# Patient Record
Sex: FEMALE | Race: WHITE | Employment: FULL TIME | URBAN - METROPOLITAN AREA
[De-identification: names, ages, dates, MRNs, and addresses within clinical notes are randomized per-mention and may not be internally consistent; named-entity substitution may affect disease eponyms.]

---

## 2017-01-04 ENCOUNTER — OFFICE VISIT (OUTPATIENT)
Dept: PULMONOLOGY | Age: 47
End: 2017-01-04

## 2017-01-04 VITALS
WEIGHT: 209 LBS | BODY MASS INDEX: 31.67 KG/M2 | HEART RATE: 74 BPM | HEIGHT: 68 IN | OXYGEN SATURATION: 98 % | DIASTOLIC BLOOD PRESSURE: 80 MMHG | SYSTOLIC BLOOD PRESSURE: 112 MMHG

## 2017-01-04 DIAGNOSIS — E66.9 NON MORBID OBESITY, UNSPECIFIED OBESITY TYPE: Chronic | ICD-10-CM

## 2017-01-04 DIAGNOSIS — G43.909 MIGRAINE WITHOUT STATUS MIGRAINOSUS, NOT INTRACTABLE, UNSPECIFIED MIGRAINE TYPE: Chronic | ICD-10-CM

## 2017-01-04 DIAGNOSIS — G47.33 OBSTRUCTIVE SLEEP APNEA (ADULT) (PEDIATRIC): Primary | ICD-10-CM

## 2017-01-04 PROCEDURE — 99213 OFFICE O/P EST LOW 20 MIN: CPT | Performed by: INTERNAL MEDICINE

## 2017-01-04 ASSESSMENT — SLEEP AND FATIGUE QUESTIONNAIRES
HOW LIKELY ARE YOU TO NOD OFF OR FALL ASLEEP WHEN YOU ARE A PASSENGER IN A CAR FOR AN HOUR WITHOUT A BREAK: 1
ESS TOTAL SCORE: 6
HOW LIKELY ARE YOU TO NOD OFF OR FALL ASLEEP WHILE WATCHING TV: 1
HOW LIKELY ARE YOU TO NOD OFF OR FALL ASLEEP WHILE SITTING AND READING: 1
HOW LIKELY ARE YOU TO NOD OFF OR FALL ASLEEP WHILE LYING DOWN TO REST IN THE AFTERNOON WHEN CIRCUMSTANCES PERMIT: 3
HOW LIKELY ARE YOU TO NOD OFF OR FALL ASLEEP IN A CAR, WHILE STOPPED FOR A FEW MINUTES IN TRAFFIC: 0
HOW LIKELY ARE YOU TO NOD OFF OR FALL ASLEEP WHILE SITTING QUIETLY AFTER LUNCH WITHOUT ALCOHOL: 0
HOW LIKELY ARE YOU TO NOD OFF OR FALL ASLEEP WHILE SITTING AND TALKING TO SOMEONE: 0
HOW LIKELY ARE YOU TO NOD OFF OR FALL ASLEEP WHILE SITTING INACTIVE IN A PUBLIC PLACE: 0

## 2017-01-04 ASSESSMENT — ENCOUNTER SYMPTOMS
APNEA: 0
CHOKING: 0
COUGH: 0
SHORTNESS OF BREATH: 0
RHINORRHEA: 0

## 2017-01-26 ENCOUNTER — CARE COORDINATION (OUTPATIENT)
Dept: CARE COORDINATION | Age: 47
End: 2017-01-26

## 2017-02-05 ENCOUNTER — TELEPHONE (OUTPATIENT)
Dept: INTERNAL MEDICINE CLINIC | Age: 47
End: 2017-02-05

## 2017-02-05 DIAGNOSIS — R92.8 ABNORMAL MAMMOGRAM OF RIGHT BREAST: Primary | ICD-10-CM

## 2017-03-30 ENCOUNTER — OFFICE VISIT (OUTPATIENT)
Dept: PULMONOLOGY | Age: 47
End: 2017-03-30

## 2017-03-30 VITALS
SYSTOLIC BLOOD PRESSURE: 118 MMHG | OXYGEN SATURATION: 99 % | WEIGHT: 207 LBS | HEART RATE: 69 BPM | BODY MASS INDEX: 31.37 KG/M2 | DIASTOLIC BLOOD PRESSURE: 82 MMHG | HEIGHT: 68 IN

## 2017-03-30 DIAGNOSIS — G43.909 MIGRAINE WITHOUT STATUS MIGRAINOSUS, NOT INTRACTABLE, UNSPECIFIED MIGRAINE TYPE: Chronic | ICD-10-CM

## 2017-03-30 DIAGNOSIS — E66.9 NON MORBID OBESITY, UNSPECIFIED OBESITY TYPE: Chronic | ICD-10-CM

## 2017-03-30 DIAGNOSIS — G47.33 OBSTRUCTIVE SLEEP APNEA (ADULT) (PEDIATRIC): Primary | Chronic | ICD-10-CM

## 2017-03-30 PROCEDURE — 99213 OFFICE O/P EST LOW 20 MIN: CPT | Performed by: INTERNAL MEDICINE

## 2017-03-30 ASSESSMENT — SLEEP AND FATIGUE QUESTIONNAIRES
HOW LIKELY ARE YOU TO NOD OFF OR FALL ASLEEP WHILE SITTING QUIETLY AFTER LUNCH WITHOUT ALCOHOL: 0
HOW LIKELY ARE YOU TO NOD OFF OR FALL ASLEEP IN A CAR, WHILE STOPPED FOR A FEW MINUTES IN TRAFFIC: 0
HOW LIKELY ARE YOU TO NOD OFF OR FALL ASLEEP WHILE SITTING AND TALKING TO SOMEONE: 0
HOW LIKELY ARE YOU TO NOD OFF OR FALL ASLEEP WHILE LYING DOWN TO REST IN THE AFTERNOON WHEN CIRCUMSTANCES PERMIT: 1
HOW LIKELY ARE YOU TO NOD OFF OR FALL ASLEEP WHILE SITTING INACTIVE IN A PUBLIC PLACE: 0
HOW LIKELY ARE YOU TO NOD OFF OR FALL ASLEEP WHILE SITTING AND READING: 1
ESS TOTAL SCORE: 5
HOW LIKELY ARE YOU TO NOD OFF OR FALL ASLEEP WHILE WATCHING TV: 2
HOW LIKELY ARE YOU TO NOD OFF OR FALL ASLEEP WHEN YOU ARE A PASSENGER IN A CAR FOR AN HOUR WITHOUT A BREAK: 1

## 2017-03-30 ASSESSMENT — ENCOUNTER SYMPTOMS
APNEA: 0
SHORTNESS OF BREATH: 0
RHINORRHEA: 0
CHOKING: 0
COUGH: 0

## 2017-05-31 ENCOUNTER — TELEPHONE (OUTPATIENT)
Dept: INTERNAL MEDICINE CLINIC | Age: 47
End: 2017-05-31

## 2017-06-01 ENCOUNTER — OFFICE VISIT (OUTPATIENT)
Dept: INTERNAL MEDICINE CLINIC | Age: 47
End: 2017-06-01

## 2017-06-01 ENCOUNTER — TELEPHONE (OUTPATIENT)
Dept: INTERNAL MEDICINE CLINIC | Age: 47
End: 2017-06-01

## 2017-06-01 VITALS
SYSTOLIC BLOOD PRESSURE: 122 MMHG | BODY MASS INDEX: 32.33 KG/M2 | TEMPERATURE: 98.2 F | HEART RATE: 60 BPM | WEIGHT: 206 LBS | OXYGEN SATURATION: 99 % | DIASTOLIC BLOOD PRESSURE: 76 MMHG | HEIGHT: 67 IN

## 2017-06-01 DIAGNOSIS — B86 SCABIES: Primary | ICD-10-CM

## 2017-06-01 PROCEDURE — 99213 OFFICE O/P EST LOW 20 MIN: CPT | Performed by: INTERNAL MEDICINE

## 2017-06-01 RX ORDER — PERMETHRIN 50 MG/G
CREAM TOPICAL
Qty: 1 TUBE | Refills: 1 | Status: SHIPPED | OUTPATIENT
Start: 2017-06-01 | End: 2018-01-08

## 2017-06-01 RX ORDER — HYDROXYZINE HYDROCHLORIDE 25 MG/1
25 TABLET, FILM COATED ORAL 3 TIMES DAILY PRN
Qty: 60 TABLET | Refills: 0 | Status: SHIPPED | OUTPATIENT
Start: 2017-06-01 | End: 2018-01-08

## 2017-06-01 ASSESSMENT — ENCOUNTER SYMPTOMS: COUGH: 0

## 2017-06-01 ASSESSMENT — PATIENT HEALTH QUESTIONNAIRE - PHQ9
SUM OF ALL RESPONSES TO PHQ9 QUESTIONS 1 & 2: 0
1. LITTLE INTEREST OR PLEASURE IN DOING THINGS: 0
2. FEELING DOWN, DEPRESSED OR HOPELESS: 0
SUM OF ALL RESPONSES TO PHQ QUESTIONS 1-9: 0

## 2017-06-06 RX ORDER — PERMETHRIN 50 MG/G
CREAM TOPICAL
Qty: 60 G | Refills: 2 | Status: SHIPPED | OUTPATIENT
Start: 2017-06-06 | End: 2018-01-08

## 2017-09-27 ENCOUNTER — TELEPHONE (OUTPATIENT)
Dept: SLEEP MEDICINE | Age: 47
End: 2017-09-27

## 2018-01-08 ENCOUNTER — OFFICE VISIT (OUTPATIENT)
Dept: INTERNAL MEDICINE CLINIC | Age: 48
End: 2018-01-08

## 2018-01-08 VITALS
SYSTOLIC BLOOD PRESSURE: 114 MMHG | BODY MASS INDEX: 31.37 KG/M2 | HEART RATE: 74 BPM | HEIGHT: 68 IN | DIASTOLIC BLOOD PRESSURE: 70 MMHG | RESPIRATION RATE: 16 BRPM | OXYGEN SATURATION: 99 % | WEIGHT: 207 LBS | TEMPERATURE: 98.5 F

## 2018-01-08 DIAGNOSIS — S49.92XA INJURY OF LEFT SHOULDER, INITIAL ENCOUNTER: Primary | ICD-10-CM

## 2018-01-08 PROCEDURE — 99213 OFFICE O/P EST LOW 20 MIN: CPT | Performed by: INTERNAL MEDICINE

## 2018-01-08 RX ORDER — TRAMADOL HYDROCHLORIDE 50 MG/1
50 TABLET ORAL EVERY 8 HOURS PRN
Qty: 60 TABLET | Refills: 0 | Status: SHIPPED | OUTPATIENT
Start: 2018-01-08 | End: 2018-02-07

## 2018-01-08 RX ORDER — NAPROXEN 500 MG/1
500 TABLET ORAL 2 TIMES DAILY
Qty: 60 TABLET | Refills: 0 | Status: SHIPPED | OUTPATIENT
Start: 2018-01-08 | End: 2022-07-12

## 2018-01-08 NOTE — PROGRESS NOTES
Subjective:      Patient ID: Sourav Chi is a 56 y.o. female. Shoulder Pain    The pain is present in the left shoulder. This is a new problem. Episode onset: 6 months. There has been no history of extremity trauma. The problem occurs daily. The problem has been waxing and waning (initially getting better and now it is worse. ). The quality of the pain is described as aching. The pain is at a severity of 8/10. The pain is moderate. Associated symptoms include a limited range of motion and stiffness. Pertinent negatives include no fever, inability to bear weight, itching, joint locking, joint swelling, numbness or tingling. The symptoms are aggravated by activity. She has tried acetaminophen and NSAIDS for the symptoms. The treatment provided moderate relief. Family history does not include gout or rheumatoid arthritis. There is no history of diabetes, gout, osteoarthritis or rheumatoid arthritis. BMI 31. She did meet dietician. Review of Systems   Constitutional: Negative for fever. Musculoskeletal: Positive for stiffness. Negative for gout. Skin: Negative for itching. Neurological: Negative for tingling and numbness. All other systems reviewed and are negative. No Known Allergies    Current Outpatient Prescriptions   Medication Sig Dispense Refill    permethrin (ELIMITE) 5 % cream Apply topically as directed 60 g 2    permethrin (ELIMITE) 5 % cream Massage into skin from head to feet, leave on 8-14 hours, wash off. May repeat in 14 days.  1 Tube 1    butalbital-acetaminophen-caffeine (FIORICET, ESGIC) -40 MG per tablet Take 1 tablet by mouth every 6 hours as needed for Headaches 180 tablet 3    naltrexone-bupropion (CONTRAVE) 8-90 MG per extended release tablet Take 2 tablets by mouth See Admin Instructions Week 1:  1 po qdaily   Week 2:  1 po qam and 1 po qpm  Week 3:  2 po qam and 1 po qpm  Week 4:  2 po qam and 2 po qam 70 tablet 0    naproxen (NAPROSYN) 500 MG tablet Take 1 tablet by mouth 2 times daily. 60 tablet 0     No current facility-administered medications for this visit. Vitals:    01/08/18 1355   BP: 114/70   Pulse: 74   Resp: 16   Temp: 98.5 °F (36.9 °C)   TempSrc: Oral   SpO2: 99%   Weight: 207 lb (93.9 kg)   Height: 5' 8\" (1.727 m)     Body mass index is 31.47 kg/m². Wt Readings from Last 3 Encounters:   01/08/18 207 lb (93.9 kg)   06/01/17 206 lb (93.4 kg)   03/30/17 207 lb (93.9 kg)     BP Readings from Last 3 Encounters:   01/08/18 114/70   06/01/17 122/76   03/30/17 118/82       Objective:   Physical Exam   Constitutional: She is oriented to person, place, and time. Vital signs are normal. She appears well-developed and well-nourished. No distress. HENT:   Head: Normocephalic and atraumatic. Right Ear: Hearing normal.   Left Ear: Hearing normal.   Nose: Nose normal.   Mouth/Throat: Oropharynx is clear and moist.   Eyes: Conjunctivae and EOM are normal. Pupils are equal, round, and reactive to light. Neck: Normal range of motion. Neck supple. No thyromegaly present. Cardiovascular: Normal rate, regular rhythm, normal heart sounds and intact distal pulses. Pulmonary/Chest: Effort normal and breath sounds normal.   Abdominal: Soft. Bowel sounds are normal. She exhibits no distension. Musculoskeletal:        Right shoulder: Normal.        Left shoulder: She exhibits decreased range of motion, tenderness, pain and decreased strength. She exhibits no bony tenderness, no swelling, no effusion, no crepitus, no deformity, no laceration, no spasm and normal pulse. Neurological: She is alert and oriented to person, place, and time. Skin: Skin is warm. Psychiatric: She has a normal mood and affect. Her behavior is normal. Judgment and thought content normal.   Nursing note and vitals reviewed. Assessment/Plan:  Zen Thao was seen today for shoulder pain.     Diagnoses and all orders for this visit:    Injury of left shoulder, initial encounter  - MRI Shoulder Left W JT WO Contrast; Future  -     [facility] PT - [facility]  -     Diclofenac Sodium POWD; Apply 1-2 g topically 4 times daily Aekhrht1B Baclo2%+Diclo3%+DMSO5%+Gaba6%+Lido2%+Prilo2% Pharm to Cmpd  -     naproxen (NAPROSYN) 500 MG tablet; Take 1 tablet by mouth 2 times daily  -     traMADol (ULTRAM) 50 MG tablet; Take 1 tablet by mouth every 8 hours as needed for Pain (use sparingly) for up to 30 days. Adult BMI 31.0-31.9 kg/sq m  -     naltrexone-bupropion (CONTRAVE) 8-90 MG per extended release tablet; Take 2 tablets by mouth See Admin Instructions Week 1:  1 po qdaily   Week 2:  1 po qam and 1 po qpm  Week 3:  2 po qam and 1 po qpm  Week 4:  2 po qam and 2 po qam      Return in about 2 months (around 3/8/2018) for shoulder and weight check.

## 2018-01-16 ENCOUNTER — HOSPITAL ENCOUNTER (OUTPATIENT)
Dept: MRI IMAGING | Age: 48
Discharge: HOME | End: 2018-01-16
Attending: INTERNAL MEDICINE | Admitting: INTERNAL MEDICINE

## 2018-01-16 DIAGNOSIS — S49.92XA UNSPECIFIED INJURY OF LEFT SHOULDER AND UPPER ARM, INITIAL ENCOUNTER: ICD-10-CM

## 2018-01-16 DIAGNOSIS — S49.92XA INJURY OF LEFT SHOULDER, INITIAL ENCOUNTER: ICD-10-CM

## 2018-01-17 ENCOUNTER — TELEPHONE (OUTPATIENT)
Dept: INTERNAL MEDICINE CLINIC | Age: 48
End: 2018-01-17

## 2018-01-18 ENCOUNTER — TELEPHONE (OUTPATIENT)
Dept: INTERNAL MEDICINE CLINIC | Age: 48
End: 2018-01-18

## 2018-01-29 ENCOUNTER — HOSPITAL ENCOUNTER (OUTPATIENT)
Dept: PHYSICAL THERAPY | Age: 48
Discharge: HOME | End: 2018-01-31
Attending: INTERNAL MEDICINE | Admitting: INTERNAL MEDICINE

## 2018-01-29 NOTE — FLOWSHEET NOTE
Full no pain  Ext full no pain  R rot full pain L ant shoulder   L rot  Full no pain  R SB full no pain  L SB full  No pain       Other                    Strength defer due pain 1/29 L R Comment   Flexion         Abduction         ER         IR         Supraspinatus         Upper Trap         Lower Trap         Mid Trap         Rhomboids         Biceps         Triceps         Horizontal Abduction         Horizontal Adduction         Lats            Special Tests Results/Comment   subscap bear hug + L 1/29   Neers     Speeds     OBriens     Other     Neurologic Signs     Functional Reach        Reflexes/Sensation:               [x]Dermatomes/Myotomes intact 1/29              [x]Reflexes equal and normal bilaterally              []Other:     Joint mobility: deferred due to pain 1/29              []Normal               []Hypo              []Hyper     Palpation: mild TTP  anterior shoulder 1/29     Functional Mobility/Transfers: I with mild L shoulder protective posture 1/29     Posture: slouched but otherwise grossly WFL for 53 yr old female  1/29     Bandages/Dressings/Incisions: NA  1/29     Gait: (include devices/WB status): WNL with mild L shoulder protective posture 1/29                          [x] Patient history, allergies, meds reviewed. Medical chart reviewed. See intake form. 1/29     Review Of Systems (ROS):  [x]Performed Review of systems (Integumentary, CardioPulmonary, Neurological) by intake and observation. Intake form has been scanned into medical record. Patient has been instructed to contact their primary care physician regarding ROS issues if not already being addressed at this time.   1/29      RESTRICTIONS/PRECAUTIONS: none    Exercises/Interventions:   Exercises:    Exercise/Equipment Resistance/Repetitions Other comments   Stretching/PROM     Wand     Table Slides flexion [id number]  scaption [id number]    UE [place] [facility] at 0 18f00mod    Pulleys     Pendulum 30x cw ccw ea         Isometrics scapular, scapulothoracic and UE control with self care, reaching, carrying, lifting, house/yardwork, driving/computer work      Manual Treatments:  PROM / STM / Oscillations-Mobs:  G-I, II, III, IV (PA's, Inf., Post.)  [] (88773) Provided manual therapy to mobilize soft tissue/joints of cervical/CT, scapular GHJ and UE for the purpose of modulating pain, promoting relaxation,  increasing ROM, reducing/eliminating soft tissue swelling/inflammation/restriction, improving soft tissue extensibility and allowing for proper ROM for normal function with self care, reaching, carrying, lifting, house/yardwork, driving/computer work    Modalities: Ice wrap to go 1/29    Charges:  Timed Code Treatment Minutes: 35   Total Treatment Minutes: 879-765       [x] EVAL (LOW) 67326 (typically 20 minutes face-to-face)  [] EVAL (MOD) 23519 (typically 30 minutes face-to-face)  [] EVAL (HIGH) 32738 (typically 45 minutes face-to-face)  [] RE-EVAL     [x] HH(49607) x  2   [] IONTO  [] NMR (99540) x      [] VASO  [] Manual (68531) x       [] Other:  [] TA x       [] Mech Traction (73186)  [] ES(attended) (82470)      [] ES (un) (65561):     GOALS: (cut and paste from eval)  Patient stated goal: no pain, adls and hobbies without pain     Therapist goals for Patient:   Short Term Goals: To be achieved in: 2 weeks  1. Independent in HEP and progression per patient tolerance, in order to prevent re-injury. 2. Patient will have a decrease in pain to facilitate improvement in movement, function, and ADLs as indicated by Functional Deficits.     Long Term Goals: To be achieved in:   1. Disability index score of 15% or less for the UEFS to assist with reaching prior level of function. 8-10 weeks  2. Patient will demonstrate increased AROM to WNL  6-8 weeks to allow for proper joint functioning as indicated by patients Functional Deficits.    3. Patient will demonstrate an increase in Strength to good scapular and core control  for UE to allow

## 2018-01-29 NOTE — PLAN OF CARE
[facility] [address]  Phone [phone]  Fax [phone]      Physical Therapy Certification    Dear Referring Practitioner: Juni Archibald MD,    We had the pleasure of evaluating the following patient for physical therapy services at [address] [place] [address]. A summary of our findings can be found in the initial assessment below. This includes our plan of care. If you have any questions or concerns regarding these findings, please do not hesitate to contact me at the office phone number checked above. Thank you for the referral.       Physician Signature:_______________________________Date:__________________  By signing above (or electronic signature), therapists plan is approved by physician      Patient: Wendy Chi   : 1970   MRN: [id number]  Referring Physician: Referring Practitioner: Juni Archibald MD      Evaluation Date: 2018      Medical Diagnosis Information:  Diagnosis: S49.92XD(ICD-10-CM) - Injury of left shoulder   Treatment Diagnosis: L shoulder pain and decrease dROM                                         Insurance information: PT Insurance Information: anthem    Precautions/ Contra-indications: none  Latex Allergy:  [x]NO      []YES  Preferred Language for Healthcare:   [x]English       []other:    SUBJECTIVE: Patient stated complaint: on/off since 2017, may have been from doing yard work, few weeks back turning body and sneezing, increased since then    Relevant Medical History:none  Functional Disability Index:PT G-Codes  Functional Assessment Tool Used: UEFS  Score: 60%  Functional Limitation: Carrying, moving and handling objects  Carrying, Moving and Handling Objects Current Status ():  At least 40 percent but less than 60 percent impaired, limited or restricted  Carrying, Moving and Handling Objects Goal Status (): 0 percent impaired, Intake form has been scanned into medical record. Patient has been instructed to contact their primary care physician regarding ROS issues if not already being addressed at this time. 1/29  Co-morbidities/Complexities (which will affect course of rehabilitation):   []None           Arthritic conditions   []Rheumatoid arthritis (M05.9)  []Osteoarthritis (M19.91)   Cardiovascular conditions   []Hypertension (I10)  []Hyperlipidemia (E78.5)  []Angina pectoris (I20)  []Atherosclerosis (I70)   Musculoskeletal conditions   []Disc pathology   []Congenital spine pathologies   []Prior surgical intervention  []Osteoporosis (M81.8)  []Osteopenia (M85.8)   Endocrine conditions   []Hypothyroid (E03.9)  []Hyperthyroid Gastrointestinal conditions   []Constipation (X17.11)   Metabolic conditions   []Morbid obesity (E66.01)  []Diabetes type 1(E10.65) or 2 (E11.65)   []Neuropathy (G60.9)     Pulmonary conditions   []Asthma (J45)  []Coughing   []COPD (J44.9)   Psychological Disorders  []Anxiety (F41.9)  []Depression (F32.9)   []Other:   []Other:          Barriers to/and or personal factors that will affect rehab potential:              [x]Age  [x]Sex              []Motivation/Lack of Motivation                        []Co-Morbidities              []Cognitive Function, education/learning barriers              []Environmental, home barriers              []profession/work barriers  []past PT/medical experience  []other:  Justification: above noted may affect rehab potential   Falls Risk Assessment (30 days):   [x] Falls Risk assessed and no intervention required. [] Falls Risk assessed and Patient requires intervention due to being higher risk   TUG score (>12s at risk):     [] Falls education provided, including       G-Codes:  PT G-Codes  Functional Assessment Tool Used: UEFS  Score: 60%  Functional Limitation: Carrying, moving and handling objects  Carrying, Moving and Handling Objects Current Status ():  At least 40 percent but

## 2018-02-01 ENCOUNTER — HOSPITAL ENCOUNTER (OUTPATIENT)
Dept: PHYSICAL THERAPY | Age: 48
Discharge: HOME | End: 2018-02-28
Attending: INTERNAL MEDICINE | Admitting: INTERNAL MEDICINE

## 2018-02-02 ENCOUNTER — HOSPITAL ENCOUNTER (OUTPATIENT)
Dept: PHYSICAL THERAPY | Age: 48
Discharge: HOME | End: 2018-02-03
Admitting: INTERNAL MEDICINE

## 2018-02-02 ENCOUNTER — TELEPHONE (OUTPATIENT)
Dept: INTERNAL MEDICINE CLINIC | Age: 48
End: 2018-02-02

## 2018-02-02 NOTE — FLOWSHEET NOTE
Serratus     Horizontal Abd with ER     Biceps     Triceps     Retraction          Cable Column/Theraband     External Rotation     Internal Rotation     Shrugs     Lats     Ext     Flex     Scapular Retraction     BIC     TRIC     PNF          Dynamic Stability          Plyoback          Manual interventions     L shoulder PROM fl scaption ER IR 15 min start2/2            Therapeutic Exercise and NMR EXR  [x] (24104) Provided verbal/tactile cueing for activities related to strengthening, flexibility, endurance, ROM  for improvements in scapular, scapulothoracic and UE control with self care, reaching, carrying, lifting, house/yardwork, driving/computer work.    [] (54208) Provided verbal/tactile cueing for activities related to improving balance, coordination, kinesthetic sense, posture, motor skill, proprioception  to assist with  scapular, scapulothoracic and UE control with self care, reaching, carrying, lifting, house/yardwork, driving/computer work. Therapeutic Activities:    [] (40302 or 88551) Provided verbal/tactile cueing for activities related to improving balance, coordination, kinesthetic sense, posture, motor skill, proprioception and motor activation to allow for proper function of scapular, scapulothoracic and UE control with self care, carrying, lifting, driving/computer work.      Home Exercise Program:    [x] (27456) Reviewed/Progressed HEP activities related to strengthening, flexibility, endurance, ROM of scapular, scapulothoracic and UE control with self care, reaching, carrying, lifting, house/yardwork, driving/computer work  [] (04607) Reviewed/Progressed HEP activities related to improving balance, coordination, kinesthetic sense, posture, motor skill, proprioception of scapular, scapulothoracic and UE control with self care, reaching, carrying, lifting, house/yardwork, driving/computer work      Manual Treatments:  PROM / STM / Oscillations-Mobs:  G-I, II, III, IV (PA's, Inf., Post.)  [x] (89301) Provided manual therapy to mobilize soft tissue/joints of cervical/CT, scapular GHJ and UE for the purpose of modulating pain, promoting relaxation,  increasing ROM, reducing/eliminating soft tissue swelling/inflammation/restriction, improving soft tissue extensibility and allowing for proper ROM for normal function with self care, reaching, carrying, lifting, house/yardwork, driving/computer work    Modalities: Ice to go 2/2    Charges:  Timed Code Treatment Minutes: 40   Total Treatment Minutes: 446-200       [] EVAL (LOW) 30583 (typically 20 minutes face-to-face)  [] EVAL (MOD) 55595 (typically 30 minutes face-to-face)  [] EVAL (HIGH) 92867 (typically 45 minutes face-to-face)  [] RE-EVAL     [x] QE(93188) x  2   [] IONTO  [] NMR (55302) x      [] VASO  [x] Manual (30814) x  4    [] Other:  [] TA x       [] Mech Traction (18833)  [] ES(attended) (43597)      [] ES (un) (06159):     GOALS: (cut and paste from eval)  Patient stated goal: no pain, adls and hobbies without pain     Therapist goals for Patient:   Short Term Goals: To be achieved in: 2 weeks  1. Independent in HEP and progression per patient tolerance, in order to prevent re-injury. 2. Patient will have a decrease in pain to facilitate improvement in movement, function, and ADLs as indicated by Functional Deficits.     Long Term Goals: To be achieved in:   1. Disability index score of 15% or less for the UEFS to assist with reaching prior level of function. 8-10 weeks  2. Patient will demonstrate increased AROM to WNL  6-8 weeks to allow for proper joint functioning as indicated by patients Functional Deficits. 3. Patient will demonstrate an increase in Strength to good scapular and core control  for UE to allow for proper functional mobility as indicated by patients Functional Deficits. 10-12 weeks  4. Patient will return to  functional activities without increased symptoms or restriction.  adl s6-8 weeks , yard work hobbies/

## 2018-02-09 ENCOUNTER — HOSPITAL ENCOUNTER (OUTPATIENT)
Dept: PHYSICAL THERAPY | Age: 48
Discharge: HOME | End: 2018-02-10
Admitting: INTERNAL MEDICINE

## 2018-02-09 NOTE — FLOWSHEET NOTE
[address], [address] [place] [address]  Phone: [phone]   Fax:     [phone]    Physical Therapy Daily Treatment Note  Date:  2018    Patient Name:  Jordan Chi    :  1970  MRN: [id number]  Restrictions/Precautions:    Medical/Treatment Diagnosis Information:  · Diagnosis: S49.92XD(ICD-10-CM) - Injury of left shoulder   ·   · Treatment Diagnosis: L shoulder pain and decrease dROM   FINDINGS:  MRI results    ROTATOR CUFF: Small interstitial tear of the distal subscapularis tendon mid   fibers measuring 3 x 2 mm.  Rotator cuff otherwise is intact.  Rotator cuff   musculature is normal in bulk and signal.  Small cystic change at the   insertion of the infraspinatus.      ·   Insurance/Certification information:  PT Insurance Information: get  Physician Information:  Referring Practitioner: Beena Milian MD  Plan of care signed (Y/N):     Date of Patient follow up with Physician:     G-Code (if applicable):      Date G-Code Applied:    60%           Latex Allergy:  [x]NO      []YES  Preferred Language for Healthcare:   [x]English       []other:    Visit # Insurance Allowable   3        eval  UNL  80/20   Progress Note:   Next due by: Visit #10  Or week of   Pain level:  4/10 2/9    SUBJECTIVE:    Pain is no longer constant    OBJECTIVE:   R hand dominant   ROM PROM AROM  Comment     L R L      Flexion ~145 pain           Abduction ~110 pain           ER Tae@[email or web address]           IR Karolyn@[email or web address]* pain           Other(cervical)     Flexion  Full no pain  Ext full no pain  R rot full pain L ant shoulder   L rot  Full no pain  R SB full no pain  L SB full  No pain       Other                    Strength defer due pain  L R Comment   Flexion         Abduction         ER         IR         Supraspinatus         Upper Trap         Lower Trap         Mid Trap         Rhomboids     Oscillations-Mobs:  G-I, II, III, IV (PA's, Inf., Post.)  [x] (34278) Provided manual therapy to mobilize soft tissue/joints of cervical/CT, scapular GHJ and UE for the purpose of modulating pain, promoting relaxation,  increasing ROM, reducing/eliminating soft tissue swelling/inflammation/restriction, improving soft tissue extensibility and allowing for proper ROM for normal function with self care, reaching, carrying, lifting, house/yardwork, driving/computer work    Modalities:  Ice at work  2/9    Charges:  Timed Code Treatment Minutes: 40   Total Treatment Minutes: 300-410       [] EVAL (LOW) 01063 (typically 20 minutes face-to-face)  [] EVAL (MOD) 53979 (typically 30 minutes face-to-face)  [] EVAL (HIGH) 57330 (typically 45 minutes face-to-face)  [] RE-EVAL     [x] IE(11069) x  2   [] IONTO  [] NMR (95266) x      [] VASO  [x] Manual (40107) x  1    [] Other:  [] TA x       [] Mech Traction (66798)  [] ES(attended) (94797)      [] ES (un) (10900):     GOALS: (cut and paste from eval)  Patient stated goal: no pain, adls and hobbies without pain     Therapist goals for Patient:   Short Term Goals: To be achieved in: 2 weeks  1. Independent in HEP and progression per patient tolerance, in order to prevent re-injury. 2. Patient will have a decrease in pain to facilitate improvement in movement, function, and ADLs as indicated by Functional Deficits.     Long Term Goals: To be achieved in:   1. Disability index score of 15% or less for the UEFS to assist with reaching prior level of function. 8-10 weeks  2. Patient will demonstrate increased AROM to WNL  6-8 weeks to allow for proper joint functioning as indicated by patients Functional Deficits. 3. Patient will demonstrate an increase in Strength to good scapular and core control  for UE to allow for proper functional mobility as indicated by patients Functional Deficits. 10-12 weeks  4.  Patient will return to  functional activities without increased symptoms

## 2018-02-14 ENCOUNTER — HOSPITAL ENCOUNTER (OUTPATIENT)
Dept: PHYSICAL THERAPY | Age: 48
Discharge: HOME | End: 2018-02-15
Admitting: INTERNAL MEDICINE

## 2018-02-14 NOTE — FLOWSHEET NOTE
Upper Trap         Lower Trap         Mid Trap         Rhomboids         Biceps         Triceps         Horizontal Abduction         Horizontal Adduction         Lats            Special Tests Results/Comment   subscap bear hug + L 1/29   Neers     Speeds     OBriens     Other     Neurologic Signs     Functional Reach        Reflexes/Sensation:               [x]Dermatomes/Myotomes intact 1/29              [x]Reflexes equal and normal bilaterally              []Other:     Joint mobility: deferred due to pain 1/29              []Normal               []Hypo              []Hyper     Palpation: mild TTP  anterior shoulder 1/29     Functional Mobility/Transfers: I with mild L shoulder protective posture 1/29     Posture: slouched but otherwise grossly WFL for 53 yr old female  1/29     Bandages/Dressings/Incisions: NA  1/29     Gait: (include devices/WB status): WNL with mild L shoulder protective posture 1/29                          [x] Patient history, allergies, meds reviewed. Medical chart reviewed. See intake form. 1/29     Review Of Systems (ROS):  [x]Performed Review of systems (Integumentary, CardioPulmonary, Neurological) by intake and observation. Intake form has been scanned into medical record. Patient has been instructed to contact their primary care physician regarding ROS issues if not already being addressed at this time.   1/29      RESTRICTIONS/PRECAUTIONS: none    Exercises/Interventions:   Exercises:    Exercise/Equipment Resistance/Repetitions Other comments   Stretching/PROM     Wand     Table Slides flexion 05x16xqw  scaption [id number]    UE [place]ael     Pulleys Flexion [id number]  scaption 38x68qsh start   Pendulum 30x cw ccw ea    IR grab hand from behind    start2/2   Isometrics     Retraction 3x10         Weight shift     Flexion     Abduction     External Rotation     Internal Rotation     Biceps     Triceps          PRE's     Flexion     Abduction     External Rotation     Internal activities without increased symptoms or restriction. adl s6-8 weeks , yard work hobbies/ 10-12 weeks                  Progression Towards Functional goals:  [] Patient is progressing as expected towards functional goals listed. [] Progression is slowed due to complexities listed. [] Progression has been slowed due to co-morbidities. [x] Plan just implemented, too soon to assess goals progression  [] Other:     ASSESSMENT:  See eval    Treatment/Activity Tolerance:  [x] Patient tolerated treatment fair 2/14 [] Patient limited by fatique  [x] Patient limited by pain 2/14 [] Patient limited by other medical complications  [x] Other: modified treatment due to increased pain since Saturday.   Reviewed with pt that she may benefit from orhto consult and a possible cortisone injection if she continues to have increased pain 2/14     Patient education: 1/29 reviewed HEP and POC     Prognosis: [x] Good [] Fair  [] Poor    Patient Requires Follow-up: [x] Yes  [] No    PLAN: 1-2 days per week for 4 Weeks:1/29-2/29/2018  [x] Continue per plan of care [] Alter current plan (see comments)  [] Plan of care initiated [] Hold pending MD visit [] Discharge    Electronically signed by: Cullen Cameron PT,

## 2018-02-20 ENCOUNTER — OFFICE VISIT (OUTPATIENT)
Dept: ORTHOPEDIC SURGERY | Age: 48
End: 2018-02-20

## 2018-02-20 VITALS
WEIGHT: 204 LBS | SYSTOLIC BLOOD PRESSURE: 123 MMHG | HEIGHT: 68 IN | DIASTOLIC BLOOD PRESSURE: 81 MMHG | BODY MASS INDEX: 30.92 KG/M2 | HEART RATE: 80 BPM

## 2018-02-20 DIAGNOSIS — M25.512 LEFT SHOULDER PAIN, UNSPECIFIED CHRONICITY: Primary | ICD-10-CM

## 2018-02-20 DIAGNOSIS — M75.112 PARTIAL TEAR OF LEFT ROTATOR CUFF: ICD-10-CM

## 2018-02-20 PROCEDURE — 99203 OFFICE O/P NEW LOW 30 MIN: CPT | Performed by: ORTHOPAEDIC SURGERY

## 2018-02-20 RX ORDER — METHYLPREDNISOLONE 4 MG/1
TABLET ORAL
Qty: 1 KIT | Refills: 0 | Status: SHIPPED | OUTPATIENT
Start: 2018-02-20 | End: 2018-02-26

## 2018-02-20 NOTE — PROGRESS NOTES
qpm  Week 3:  2 po qam and 1 po qpm  Week 4:  2 po qam and 2 po qam 70 tablet 0    butalbital-acetaminophen-caffeine (FIORICET, ESGIC) -40 MG per tablet Take 1 tablet by mouth every 6 hours as needed for Headaches 180 tablet 3     No current facility-administered medications on file prior to visit.           Review of Systems:  A 14 point review of systems available in the scanned medical record, dated today, under the media tab, as documented by the patient. The review is negative with the exception of those things mentioned in the HPI and Past Medical History. Vital Signs:  Vitals:    02/20/18 1500   BP: 123/81   Pulse: 80       General Exam:   Constitutional: Patient is adequately groomed with no evidence of malnutrition  Mental Status: The patient is oriented to time, place and person. The patient's mood and affect are appropriate. Vascular: Examination reveals no swelling or calf tenderness. Peripheral pulses are palpable and 2+. Upper Extremity:    L shoulder exam    Inspection:  No gross deformities, periscapular musculature is symmetry without atrophy, no obvious winging    Palpation: Mild TTP anterior over biceps tendon. NTTP over her AC    Active/Passive ROM:FF to 100 (P150), EROT 20, IROT to Sacrum    Strength: 5/5  supraspinatus, 4/5 infraspinatus, teres minor, and 3+/5 subscapularis    Stability: negative sulcus sign, no evidence of instability    Neurovascular: Neurovascularly intact    Special Tests:MIldly positive belly press and (+) lift off. (-) Empty can.          R comparison shoulder exam    Inspection:  No gross deformities, periscapular musculature is symmetry without atrophy, no obvious winging    Palpation: No significant tenderness overlying the rotator cuff footprint, bicipital groove, AC joint, or periscapular region    Active/Passive ROM: full in forward flexion, abduction, external rotation with the elbow at the side, and internal rotation    Strength: 5/5 strength supraspinatus, infraspinatus, teres minor, and subscapularis    Stability: negative sulcus sign, no evidence of instability    Neurovascular: Neurovascularly intact      Imagin view x-rays of left shoulder were obtained in the office today including AP and lateral.  No bony abnormalities were noted. We reviewed her MRI here that showed a partial subscap injury with a slightly perched biceps tendon. Assessment :  L shoulder RCT    Impression:  Encounter Diagnoses   Name Primary?  Left shoulder pain, unspecified chronicity Yes    Partial tear of left rotator cuff          Plan:  We discussed the pathophysiology, as well as the natural history of this type of disease process with the patient. We reviewed the imaging as well as the pertinent anatomy with handouts. When he has a difficult problem today. The main question is whether she just bruised her shoulder and has a minor exacerbation, or whether she turned a partial rotator cuff tear that was well healing into complete rotator cuff tear. We discussed with her the options of both pursuing an old tear versus treating her symptomatically right now getting her back in a rehab. She chose the latter. We'll get her a Medrol Dosepak to try to knock down her inflammation symptoms are now for a few days and then her get back into rehab. If over the next couple weeks she is not feeling she is clearly going the correct direction, we'll obtain another MRI of her left shoulder, with the suspicion than of having completed subscapularis tear. Her function is good on exam today but she may well be compensating with her deltoid and latissimus muscles. Medrol, f/u 2 weeks    We reviewed the plan with the patient, who verbally understands, and is electing to proceed. If symptoms are not moving in the correct direction, we will repeat exam, and consider any other advanced imaging.   Patient understands there to come back sooner if they experience any

## 2018-02-20 NOTE — PROGRESS NOTES
Review of Systems   Musculoskeletal: Positive for joint pain. Left shoulder     Neurological: Positive for headaches. All other systems reviewed and are negative.

## 2018-02-20 NOTE — PROGRESS NOTES
activity: Yes     Partners: Male     Other Topics Concern    Not on file     Social History Narrative    Lives home with  and daughter. She has an inherited Tarantula a 36year old tarantula. No Known Allergies  Current Outpatient Prescriptions on File Prior to Visit   Medication Sig Dispense Refill    Diclofenac Sodium POWD Apply 1-2 g topically 4 times daily [id number] Baclo2%+Diclo3%+DMSO5%+Gaba6%+Lido2%+Prilo2% Pharm to Cmpd 120 g 2    naproxen (NAPROSYN) 500 MG tablet Take 1 tablet by mouth 2 times daily 60 tablet 0    naltrexone-bupropion (CONTRAVE) 8-90 MG per extended release tablet Take 2 tablets by mouth See Admin Instructions Week 1:  1 po qdaily   Week 2:  1 po qam and 1 po qpm  Week 3:  2 po qam and 1 po qpm  Week 4:  2 po qam and 2 po qam 70 tablet 0    butalbital-acetaminophen-caffeine (FIORICET, ESGIC) -40 MG per tablet Take 1 tablet by mouth every 6 hours as needed for Headaches 180 tablet 3     No current facility-administered medications on file prior to visit.           Review of Systems:  A 14 point review of systems available in the scanned medical record, dated today, under the media tab, as documented by the patient. The review is negative with the exception of those things mentioned in the HPI and Past Medical History. Vital Signs:  Vitals:    02/20/18 1500   BP: 123/81   Pulse: 80       General Exam:   Constitutional: Patient is adequately groomed with no evidence of malnutrition  Mental Status: The patient is oriented to time, place and person. The patient's mood and affect are appropriate. Vascular: Examination reveals no swelling or calf tenderness. Peripheral pulses are palpable and 2+. Upper Extremity:    L shoulder exam    Inspection:  No gross deformities, periscapular musculature is symmetry without atrophy, no obvious winging    Palpation: Mild TTP anterior over biceps tendon.  NTTP over her AC    Active/Passive ROM:FF to 100 (P150), EROT 20, IROT

## 2018-02-23 DIAGNOSIS — M25.512 ACUTE PAIN OF LEFT SHOULDER: Primary | ICD-10-CM

## 2018-02-23 PROCEDURE — MISCPULLYB&C PULLY'S B&C: Performed by: ORTHOPAEDIC SURGERY

## 2018-03-01 ENCOUNTER — HOSPITAL ENCOUNTER (OUTPATIENT)
Dept: PHYSICAL THERAPY | Age: 48
Discharge: HOME | End: 2018-03-31
Attending: INTERNAL MEDICINE | Admitting: INTERNAL MEDICINE

## 2018-03-06 ENCOUNTER — TELEPHONE (OUTPATIENT)
Dept: ORTHOPEDIC SURGERY | Age: 48
End: 2018-03-06

## 2018-03-20 ENCOUNTER — OFFICE VISIT (OUTPATIENT)
Dept: ORTHOPEDIC SURGERY | Age: 48
End: 2018-03-20

## 2018-03-20 VITALS
WEIGHT: 200 LBS | SYSTOLIC BLOOD PRESSURE: 124 MMHG | DIASTOLIC BLOOD PRESSURE: 81 MMHG | HEIGHT: 68 IN | BODY MASS INDEX: 30.31 KG/M2 | HEART RATE: 74 BPM

## 2018-03-20 DIAGNOSIS — M25.512 LEFT SHOULDER PAIN, UNSPECIFIED CHRONICITY: ICD-10-CM

## 2018-03-20 DIAGNOSIS — M67.912 TENDINOPATHY OF LEFT ROTATOR CUFF: Primary | ICD-10-CM

## 2018-03-20 PROCEDURE — 20610 DRAIN/INJ JOINT/BURSA W/O US: CPT | Performed by: ORTHOPAEDIC SURGERY

## 2018-03-20 PROCEDURE — 99214 OFFICE O/P EST MOD 30 MIN: CPT | Performed by: ORTHOPAEDIC SURGERY

## 2018-03-20 NOTE — PROGRESS NOTES
Review of Systems   Musculoskeletal: Positive for joint pain. Left shoulder pain   All other systems reviewed and are negative.

## 2018-03-20 NOTE — PROGRESS NOTES
Cortisone injection: left shoulder    2cc depo medrol 40mg    Lot: [id number]  Exp: 02/2019  Ndc:8603-7189-10    4cc lidocaine 1%hcl    Lot: [id number]  Exp: Jan 1 2019  Ndc: 6440-3146-65

## 2018-03-20 NOTE — PROGRESS NOTES
Chief complaint is left shoulder pain. Patient seen for follow-up evaluation of shoulder. She has a chronic SLAP tear as well as adhesive capsulitis. She is doing rehab and feels that the pain has shifted somewhat from the anterior aspect of the shoulder down more into her arm. She still has some limitation of motion but this is improved from her last visit. She has pain associated with lifting and reaching activities. Pain Assessment  Location of Pain: Shoulder  Location Modifiers: Left  Severity of Pain: 10  Quality of Pain: Sharp, Aching (shotting)  Duration of Pain: Persistent  Frequency of Pain: Constant  Aggravating Factors:  (pulling, turning wrist)  Limiting Behavior: Yes  Relieving Factors: Ice  Result of Injury: Yes  Work-Related Injury: No  Are there other pain locations you wish to document?: No    Past Medical History:   Diagnosis Date    Migraine without status migrainosus, not intractable 2016    Sleep apnea 2016        Past Surgical History:   Procedure Laterality Date     SECTION      x1     SECTION, CLASSIC         Family History   Problem Relation Age of Onset    Other Sister      NETO    High Blood Pressure Father     Other Father      NETO    COPD Father     Heart Disease Father        Social History     Social History    Marital status:      Spouse name: N/A    Number of children: 1    Years of education: N/A     Occupational History    Senior       Social History Main Topics    Smoking status: Current Every Day Smoker     Packs/day: 0.25     Types: Cigarettes    Smokeless tobacco: Current User      Comment: 1 pk/wk    Alcohol use Yes      Comment: social    Drug use: No    Sexual activity: Yes     Partners: Male     Other Topics Concern    None     Social History Narrative    Lives home with  and daughter. She has an inherited Tarantula a 36year old tarantula.         Current Outpatient Prescriptions Medication Sig Dispense Refill    Diclofenac Sodium POWD Apply 1-2 g topically 4 times daily [id number] Baclo2%+Diclo3%+DMSO5%+Gaba6%+Lido2%+Prilo2% Pharm to Cmpd 120 g 2    naproxen (NAPROSYN) 500 MG tablet Take 1 tablet by mouth 2 times daily 60 tablet 0    naltrexone-bupropion (CONTRAVE) 8-90 MG per extended release tablet Take 2 tablets by mouth See Admin Instructions Week 1:  1 po qdaily   Week 2:  1 po qam and 1 po qpm  Week 3:  2 po qam and 1 po qpm  Week 4:  2 po qam and 2 po qam 70 tablet 0    butalbital-acetaminophen-caffeine (FIORICET, ESGIC) -40 MG per tablet Take 1 tablet by mouth every 6 hours as needed for Headaches 180 tablet 3     No current facility-administered medications for this visit. No Known Allergies    Vital signs:  /81   Pulse 74   Ht 5' 8\" (1.727 m)   Wt 200 lb (90.7 kg)   BMI 30.41 kg/m²        Neuro: Alert & oriented x 3,  normal,  no focal deficits noted. Normal affect. Eyes: sclera clear  Ears: Normal external ear  Mouth:  No perioral lesions  Pulm: Respirations unlabored and regular  Pulse: Regular rate and rhythm   Skin: Warm, well perfused        Right Shoulder Examination:    Inspection: No abnormal swelling. No erythema. No induration. Palpation: No tenderness to palpation. No palpable masses. No crepitus. Acromioclavicular joint: Nontender to palpation. Range of Motion: Full range of motion. Strength: Normal rotator cuff strength. Normal scapulothoracic rhythm. Instability: No anterior or posterior instability. Special Tests: Negative biceps findings. Negative Neer and Manjinder signs. Negative apprehension sign. Negative Bent sign. Negative thrower's sign. Neurovascular: Skin warm well perfused. Normal sensation to light touch. Left shoulder exam shows she holes and a slightly guarded position. She has about 80° of abduction and forward flexion.   She has 45° external rotation and can internally rotate to the

## 2018-04-13 ENCOUNTER — HOSPITAL ENCOUNTER (OUTPATIENT)
Dept: PHYSICAL THERAPY | Age: 48
Discharge: HOME | End: 2018-04-30
Attending: ORTHOPAEDIC SURGERY | Admitting: ORTHOPAEDIC SURGERY

## 2018-04-17 ENCOUNTER — HOSPITAL ENCOUNTER (OUTPATIENT)
Dept: PHYSICAL THERAPY | Age: 48
Discharge: HOME | End: 2018-04-18
Admitting: ORTHOPAEDIC SURGERY

## 2018-04-19 ENCOUNTER — HOSPITAL ENCOUNTER (OUTPATIENT)
Dept: PHYSICAL THERAPY | Age: 48
Discharge: HOME | End: 2018-04-20
Admitting: ORTHOPAEDIC SURGERY

## 2018-04-24 ENCOUNTER — OFFICE VISIT (OUTPATIENT)
Dept: ORTHOPEDIC SURGERY | Age: 48
End: 2018-04-24

## 2018-04-24 VITALS
DIASTOLIC BLOOD PRESSURE: 84 MMHG | HEIGHT: 68 IN | HEART RATE: 73 BPM | SYSTOLIC BLOOD PRESSURE: 120 MMHG | WEIGHT: 200 LBS | BODY MASS INDEX: 30.31 KG/M2

## 2018-04-24 DIAGNOSIS — M75.02 ADHESIVE CAPSULITIS OF LEFT SHOULDER: Primary | ICD-10-CM

## 2018-04-24 PROCEDURE — 99214 OFFICE O/P EST MOD 30 MIN: CPT | Performed by: ORTHOPAEDIC SURGERY

## 2018-04-27 ENCOUNTER — TELEPHONE (OUTPATIENT)
Dept: INTERNAL MEDICINE CLINIC | Age: 48
End: 2018-04-27

## 2018-05-01 ENCOUNTER — TELEPHONE (OUTPATIENT)
Dept: ORTHOPEDIC SURGERY | Age: 48
End: 2018-05-01

## 2018-05-01 ENCOUNTER — OFFICE VISIT (OUTPATIENT)
Dept: INTERNAL MEDICINE CLINIC | Age: 48
End: 2018-05-01

## 2018-05-01 ENCOUNTER — HOSPITAL ENCOUNTER (OUTPATIENT)
Dept: PHYSICAL THERAPY | Age: 48
Discharge: HOME | End: 2018-05-31
Attending: ORTHOPAEDIC SURGERY | Admitting: ORTHOPAEDIC SURGERY

## 2018-05-01 VITALS
TEMPERATURE: 98.2 F | OXYGEN SATURATION: 98 % | SYSTOLIC BLOOD PRESSURE: 120 MMHG | HEIGHT: 68 IN | WEIGHT: 203.4 LBS | HEART RATE: 62 BPM | RESPIRATION RATE: 16 BRPM | BODY MASS INDEX: 30.83 KG/M2 | DIASTOLIC BLOOD PRESSURE: 66 MMHG

## 2018-05-01 DIAGNOSIS — S49.92XA INJURY OF LEFT SHOULDER, INITIAL ENCOUNTER: ICD-10-CM

## 2018-05-01 DIAGNOSIS — Z01.810 PREOP CARDIOVASCULAR EXAM: Primary | ICD-10-CM

## 2018-05-01 DIAGNOSIS — M75.102 TEAR OF LEFT ROTATOR CUFF, UNSPECIFIED TEAR EXTENT: ICD-10-CM

## 2018-05-01 DIAGNOSIS — S43.432A SUPERIOR GLENOID LABRUM LESION OF LEFT SHOULDER, INITIAL ENCOUNTER: ICD-10-CM

## 2018-05-01 PROCEDURE — 99241 PR OFFICE CONSULTATION NEW/ESTAB PATIENT 15 MIN: CPT | Performed by: INTERNAL MEDICINE

## 2018-05-01 PROCEDURE — 93000 ELECTROCARDIOGRAM COMPLETE: CPT | Performed by: INTERNAL MEDICINE

## 2018-05-01 RX ORDER — TRAMADOL HYDROCHLORIDE 50 MG/1
50 TABLET ORAL EVERY 6 HOURS PRN
COMMUNITY
End: 2022-07-12

## 2018-05-09 ENCOUNTER — HOSPITAL ENCOUNTER (OUTPATIENT)
Dept: PREADMISSION TESTING | Age: 48
Discharge: HOME | End: 2018-05-09

## 2018-05-09 VITALS
WEIGHT: 198 LBS | BODY MASS INDEX: 30.01 KG/M2 | HEART RATE: 72 BPM | TEMPERATURE: 97.6 F | SYSTOLIC BLOOD PRESSURE: 142 MMHG | DIASTOLIC BLOOD PRESSURE: 85 MMHG | HEIGHT: 68 IN | OXYGEN SATURATION: 97 % | RESPIRATION RATE: 14 BRPM

## 2018-05-09 LAB — PREGNANCY, URINE: NEGATIVE

## 2018-05-09 RX ORDER — FENTANYL CITRATE 50 UG/ML
100 INJECTION, SOLUTION INTRAMUSCULAR; INTRAVENOUS ONCE
Status: COMPLETED | OUTPATIENT
Start: 2018-05-09 | End: 2018-05-09

## 2018-05-09 RX ORDER — OXYCODONE HYDROCHLORIDE AND ACETAMINOPHEN 5; 325 MG/1; MG/1
1 TABLET ORAL
Status: ACTIVE | OUTPATIENT
Start: 2018-05-09 | End: 2018-05-09

## 2018-05-09 RX ORDER — FENTANYL CITRATE 50 UG/ML
25 INJECTION, SOLUTION INTRAMUSCULAR; INTRAVENOUS EVERY 5 MIN PRN
Status: DISCONTINUED | OUTPATIENT
Start: 2018-05-09 | End: 2018-05-10 | Stop reason: HOSPADM

## 2018-05-09 RX ORDER — MIDAZOLAM HYDROCHLORIDE 1 MG/ML
2 INJECTION INTRAMUSCULAR; INTRAVENOUS ONCE
Status: COMPLETED | OUTPATIENT
Start: 2018-05-09 | End: 2018-05-09

## 2018-05-09 RX ORDER — ROPIVACAINE HYDROCHLORIDE 5 MG/ML
30 INJECTION, SOLUTION EPIDURAL; INFILTRATION; PERINEURAL ONCE
Status: DISCONTINUED | OUTPATIENT
Start: 2018-05-09 | End: 2018-05-10 | Stop reason: HOSPADM

## 2018-05-09 RX ORDER — SODIUM CHLORIDE, SODIUM LACTATE, POTASSIUM CHLORIDE, CALCIUM CHLORIDE 600; 310; 30; 20 MG/100ML; MG/100ML; MG/100ML; MG/100ML
INJECTION, SOLUTION INTRAVENOUS CONTINUOUS
Status: DISCONTINUED | OUTPATIENT
Start: 2018-05-09 | End: 2018-05-10 | Stop reason: HOSPADM

## 2018-05-09 RX ORDER — PROMETHAZINE HYDROCHLORIDE 25 MG/ML
6.25 INJECTION, SOLUTION INTRAMUSCULAR; INTRAVENOUS
Status: ACTIVE | OUTPATIENT
Start: 2018-05-09 | End: 2018-05-09

## 2018-05-09 RX ORDER — ONDANSETRON 2 MG/ML
4 INJECTION INTRAMUSCULAR; INTRAVENOUS
Status: ACTIVE | OUTPATIENT
Start: 2018-05-09 | End: 2018-05-09

## 2018-05-09 RX ORDER — LABETALOL HYDROCHLORIDE 5 MG/ML
5 INJECTION, SOLUTION INTRAVENOUS EVERY 10 MIN PRN
Status: DISCONTINUED | OUTPATIENT
Start: 2018-05-09 | End: 2018-05-10 | Stop reason: HOSPADM

## 2018-05-09 RX ORDER — HYDRALAZINE HYDROCHLORIDE 20 MG/ML
5 INJECTION INTRAMUSCULAR; INTRAVENOUS EVERY 10 MIN PRN
Status: DISCONTINUED | OUTPATIENT
Start: 2018-05-09 | End: 2018-05-10 | Stop reason: HOSPADM

## 2018-05-09 RX ORDER — FENTANYL CITRATE 50 UG/ML
50 INJECTION, SOLUTION INTRAMUSCULAR; INTRAVENOUS EVERY 5 MIN PRN
Status: DISCONTINUED | OUTPATIENT
Start: 2018-05-09 | End: 2018-05-10 | Stop reason: HOSPADM

## 2018-05-09 RX ADMIN — MIDAZOLAM HYDROCHLORIDE 2 MG: 1 INJECTION INTRAMUSCULAR; INTRAVENOUS at 08:36

## 2018-05-09 RX ADMIN — FENTANYL CITRATE 100 MCG: 50 INJECTION, SOLUTION INTRAMUSCULAR; INTRAVENOUS at 08:37

## 2018-05-09 ASSESSMENT — PAIN DESCRIPTION - DESCRIPTORS: DESCRIPTORS: ACHING;CONSTANT

## 2018-05-09 ASSESSMENT — PAIN SCALES - GENERAL: PAINLEVEL_OUTOF10: 6

## 2018-05-09 ASSESSMENT — PAIN - FUNCTIONAL ASSESSMENT: PAIN_FUNCTIONAL_ASSESSMENT: 0-10

## 2018-05-10 ENCOUNTER — TELEPHONE (OUTPATIENT)
Dept: ORTHOPEDIC SURGERY | Age: 48
End: 2018-05-10

## 2018-05-10 ENCOUNTER — HOSPITAL ENCOUNTER (OUTPATIENT)
Dept: PHYSICAL THERAPY | Age: 48
Discharge: HOME | End: 2018-05-11
Admitting: ORTHOPAEDIC SURGERY

## 2018-05-10 DIAGNOSIS — M75.02 ADHESIVE CAPSULITIS OF LEFT SHOULDER: Primary | ICD-10-CM

## 2018-05-11 ENCOUNTER — HOSPITAL ENCOUNTER (OUTPATIENT)
Dept: PHYSICAL THERAPY | Age: 48
Discharge: HOME | End: 2018-05-12
Admitting: ORTHOPAEDIC SURGERY

## 2018-05-14 ENCOUNTER — HOSPITAL ENCOUNTER (OUTPATIENT)
Dept: PHYSICAL THERAPY | Age: 48
Discharge: HOME | End: 2018-05-15
Admitting: ORTHOPAEDIC SURGERY

## 2018-05-16 ENCOUNTER — HOSPITAL ENCOUNTER (OUTPATIENT)
Dept: PHYSICAL THERAPY | Age: 48
Discharge: HOME | End: 2018-05-17
Admitting: ORTHOPAEDIC SURGERY

## 2018-05-18 ENCOUNTER — HOSPITAL ENCOUNTER (OUTPATIENT)
Dept: PHYSICAL THERAPY | Age: 48
Discharge: HOME | End: 2018-05-19
Admitting: ORTHOPAEDIC SURGERY

## 2018-05-21 ENCOUNTER — HOSPITAL ENCOUNTER (OUTPATIENT)
Dept: PHYSICAL THERAPY | Age: 48
Discharge: HOME | End: 2018-05-22
Admitting: ORTHOPAEDIC SURGERY

## 2018-05-23 ENCOUNTER — HOSPITAL ENCOUNTER (OUTPATIENT)
Dept: PHYSICAL THERAPY | Age: 48
Discharge: HOME | End: 2018-05-24
Admitting: ORTHOPAEDIC SURGERY

## 2018-05-30 ENCOUNTER — HOSPITAL ENCOUNTER (OUTPATIENT)
Dept: PHYSICAL THERAPY | Age: 48
Discharge: HOME | End: 2018-06-30
Admitting: ORTHOPAEDIC SURGERY

## 2018-06-01 ENCOUNTER — HOSPITAL ENCOUNTER (OUTPATIENT)
Dept: PHYSICAL THERAPY | Age: 48
Discharge: HOME | End: 2018-06-01
Attending: ORTHOPAEDIC SURGERY | Admitting: ORTHOPAEDIC SURGERY

## 2018-06-04 ENCOUNTER — HOSPITAL ENCOUNTER (OUTPATIENT)
Dept: PHYSICAL THERAPY | Age: 48
Discharge: HOME | End: 2018-06-05
Admitting: ORTHOPAEDIC SURGERY

## 2018-06-05 ENCOUNTER — OFFICE VISIT (OUTPATIENT)
Dept: ORTHOPEDIC SURGERY | Age: 48
End: 2018-06-05

## 2018-06-05 VITALS
SYSTOLIC BLOOD PRESSURE: 100 MMHG | HEART RATE: 61 BPM | DIASTOLIC BLOOD PRESSURE: 67 MMHG | WEIGHT: 198 LBS | BODY MASS INDEX: 30.01 KG/M2 | HEIGHT: 68 IN

## 2018-06-05 DIAGNOSIS — Z98.890 S/P SHOULDER SURGERY: Primary | ICD-10-CM

## 2018-06-05 DIAGNOSIS — M75.02 ADHESIVE CAPSULITIS OF LEFT SHOULDER: ICD-10-CM

## 2018-06-05 PROCEDURE — 99212 OFFICE O/P EST SF 10 MIN: CPT | Performed by: ORTHOPAEDIC SURGERY

## 2018-06-06 ENCOUNTER — HOSPITAL ENCOUNTER (OUTPATIENT)
Dept: PHYSICAL THERAPY | Age: 48
Discharge: HOME | End: 2018-06-07
Admitting: ORTHOPAEDIC SURGERY

## 2018-06-11 ENCOUNTER — TELEPHONE (OUTPATIENT)
Dept: ORTHOPEDIC SURGERY | Age: 48
End: 2018-06-11

## 2018-06-12 ENCOUNTER — TELEPHONE (OUTPATIENT)
Dept: ORTHOPEDIC SURGERY | Age: 48
End: 2018-06-12

## 2018-06-13 ENCOUNTER — HOSPITAL ENCOUNTER (OUTPATIENT)
Dept: PHYSICAL THERAPY | Age: 48
Discharge: HOME | End: 2018-06-14
Admitting: ORTHOPAEDIC SURGERY

## 2018-06-18 ENCOUNTER — HOSPITAL ENCOUNTER (OUTPATIENT)
Dept: PHYSICAL THERAPY | Age: 48
Discharge: HOME | End: 2018-06-19
Admitting: ORTHOPAEDIC SURGERY

## 2018-07-01 ENCOUNTER — HOSPITAL ENCOUNTER (OUTPATIENT)
Dept: PHYSICAL THERAPY | Age: 48
Discharge: HOME | End: 2018-07-01
Attending: ORTHOPAEDIC SURGERY | Admitting: ORTHOPAEDIC SURGERY

## 2020-06-15 ENCOUNTER — VIRTUAL VISIT (OUTPATIENT)
Dept: INTERNAL MEDICINE CLINIC | Age: 50
End: 2020-06-15
Payer: COMMERCIAL

## 2020-06-15 PROCEDURE — 99213 OFFICE O/P EST LOW 20 MIN: CPT | Performed by: NURSE PRACTITIONER

## 2020-06-15 RX ORDER — IBUPROFEN 800 MG/1
800 TABLET ORAL EVERY 8 HOURS PRN
Qty: 90 TABLET | Refills: 0 | Status: SHIPPED | OUTPATIENT
Start: 2020-06-15 | End: 2020-07-20

## 2020-06-15 ASSESSMENT — ENCOUNTER SYMPTOMS
RESPIRATORY NEGATIVE: 1
GASTROINTESTINAL NEGATIVE: 1
EYES NEGATIVE: 1
ALLERGIC/IMMUNOLOGIC NEGATIVE: 1

## 2020-06-15 ASSESSMENT — PATIENT HEALTH QUESTIONNAIRE - PHQ9
SUM OF ALL RESPONSES TO PHQ QUESTIONS 1-9: 0
2. FEELING DOWN, DEPRESSED OR HOPELESS: 0
SUM OF ALL RESPONSES TO PHQ QUESTIONS 1-9: 0
1. LITTLE INTEREST OR PLEASURE IN DOING THINGS: 0
SUM OF ALL RESPONSES TO PHQ9 QUESTIONS 1 & 2: 0

## 2020-06-15 NOTE — PROGRESS NOTES
05/01/2018    upper right    Capsulitis of shoulder, left     Dental bridge present     Dental crowns present     Migraine without status migrainosus, not intractable 6/24/2016    Sleep apnea 06/24/2016    uses CPAP sporatically        PHYSICAL EXAMINATION:  [ INSTRUCTIONS:  \"[x]\" Indicates a positive item  \"[]\" Indicates a negative item  -- DELETE ALL ITEMS NOT EXAMINED]  Vital Signs: (As obtained by patient/caregiver or practitioner observation)    Blood pressure-  Heart rate-    Respiratory rate-    Temperature-  Pulse oximetry-     Constitutional: [x] Appears well-developed and well-nourished [x] No apparent distress      [] Abnormal-   Mental status  [x] Alert and awake  [x] Oriented to person/place/time []Able to follow commands      Eyes:  EOM    [x]  Normal  [] Abnormal-  Sclera  []  Normal  [] Abnormal -         Discharge []  None visible  [] Abnormal -    HENT:   [x] Normocephalic, atraumatic.   [] Abnormal   [] Mouth/Throat: Mucous membranes are moist.     External Ears [] Normal  [] Abnormal-     Neck: [] No visualized mass     Pulmonary/Chest: [x] Respiratory effort normal.  [] No visualized signs of difficulty breathing or respiratory distress        [] Abnormal-      Musculoskeletal:   [] Normal gait with no signs of ataxia         [] Normal range of motion of neck        [x] Abnormal- tenseness on right cervical area with palpation      Neurological:        [] No Facial Asymmetry (Cranial nerve 7 motor function) (limited exam to video visit)          [] No gaze palsy        [] Abnormal-         Skin:        [] No significant exanthematous lesions or discoloration noted on facial skin         [x] Abnormal- dry patchy skin noted left elbow area           Psychiatric:       [] Normal Affect [] No Hallucinations        [] Abnormal-     Other pertinent observable physical exam findings-     ASSESSMENT/PLAN:    Cervical tightness/headache  -Ibuprofen every 8 hours with food, begin with twice a

## 2020-06-18 ENCOUNTER — TELEPHONE (OUTPATIENT)
Dept: PRIMARY CARE CLINIC | Age: 50
End: 2020-06-18

## 2020-06-18 ENCOUNTER — OFFICE VISIT (OUTPATIENT)
Dept: PRIMARY CARE CLINIC | Age: 50
End: 2020-06-18
Payer: COMMERCIAL

## 2020-06-18 VITALS — OXYGEN SATURATION: 98 % | HEART RATE: 82 BPM | TEMPERATURE: 97.9 F

## 2020-06-18 PROCEDURE — 99213 OFFICE O/P EST LOW 20 MIN: CPT | Performed by: NURSE PRACTITIONER

## 2020-06-18 NOTE — TELEPHONE ENCOUNTER
Your patient was seen at the [facility] today. A follow up virtual visit with the patient's PCP should be completed in 48 hours. Please schedule the patient for this follow-up. Thank you.

## 2020-06-20 LAB
SARS-COV-2: NOT DETECTED
SOURCE: NORMAL

## 2020-06-22 ENCOUNTER — NURSE TRIAGE (OUTPATIENT)
Dept: OTHER | Facility: CLINIC | Age: 50
End: 2020-06-22

## 2020-06-22 ENCOUNTER — TELEPHONE (OUTPATIENT)
Dept: INTERNAL MEDICINE CLINIC | Age: 50
End: 2020-06-22

## 2020-06-23 ENCOUNTER — VIRTUAL VISIT (OUTPATIENT)
Dept: INTERNAL MEDICINE CLINIC | Age: 50
End: 2020-06-23
Payer: COMMERCIAL

## 2020-06-23 PROCEDURE — 99213 OFFICE O/P EST LOW 20 MIN: CPT | Performed by: NURSE PRACTITIONER

## 2020-06-23 RX ORDER — DEXTROMETHORPHAN POLISTIREX 30 MG/5ML
60 SUSPENSION ORAL 2 TIMES DAILY PRN
Qty: 148 ML | Refills: 0 | Status: SHIPPED | OUTPATIENT
Start: 2020-06-23 | End: 2022-07-12

## 2020-06-23 ASSESSMENT — ENCOUNTER SYMPTOMS
GASTROINTESTINAL NEGATIVE: 1
COUGH: 1
ALLERGIC/IMMUNOLOGIC NEGATIVE: 1
EYES NEGATIVE: 1

## 2020-06-23 NOTE — PROGRESS NOTES
significant exanthematous lesions or discoloration noted on facial skin         [] Abnormal-            Psychiatric:       [x] Normal Affect [] No Hallucinations        [] Abnormal-     Other pertinent observable physical exam findings-     ASSESSMENT/PLAN:  Cough  -COVID-19 negative  -Chest x ray  Take cough medication as prescribed  -Ricola lemon honey cough drops as needed  -Sleep in room without pet  -Use humidifier at night      No follow-ups on file. Lisa Chi is a 53 y.o. female being evaluated by a Virtual Visit (video visit) encounter to address concerns as mentioned above. A caregiver was present when appropriate. Due to this being a TeleHealth encounter (During TWO-25 public health emergency), evaluation of the following organ systems was limited: Vitals/Constitutional/EENT/Resp/CV/GI//MS/Neuro/Skin/Heme-Lymph-Imm. Pursuant to the emergency declaration under the [address] [place] [address], [address] [place] [address] and the [employer] and [employer] Act, this Virtual Visit was conducted with patient's (and/or legal guardian's) consent, to reduce the patient's risk of exposure to COVID-19 and provide necessary medical care. The patient (and/or legal guardian) has also been advised to contact this office for worsening conditions or problems, and seek emergency medical treatment and/or call 911 if deemed necessary. Patient identification was verified at the start of the visit: Yes    Total time spent on this encounter: 16 minutes    Services were provided through a video synchronous discussion virtually to substitute for in-person clinic visit. Patient and provider were located at their individual homes. --SANTOS Herman - CNP on 6/23/2020 at 8:27 AM    An electronic signature was used to authenticate this note.

## 2020-06-24 ENCOUNTER — HOSPITAL ENCOUNTER (OUTPATIENT)
Age: 50
Discharge: HOME | End: 2020-06-24
Payer: COMMERCIAL

## 2020-06-24 ENCOUNTER — HOSPITAL ENCOUNTER (OUTPATIENT)
Dept: GENERAL RADIOLOGY | Age: 50
Discharge: HOME | End: 2020-06-24
Payer: COMMERCIAL

## 2020-06-24 PROCEDURE — 71046 X-RAY EXAM CHEST 2 VIEWS: CPT

## 2020-07-20 RX ORDER — IBUPROFEN 800 MG/1
800 TABLET ORAL EVERY 8 HOURS PRN
Qty: 90 TABLET | Refills: 0 | Status: ON HOLD | OUTPATIENT
Start: 2020-07-20 | End: 2022-08-25

## 2020-12-09 ENCOUNTER — OFFICE VISIT (OUTPATIENT)
Dept: PRIMARY CARE CLINIC | Age: 50
End: 2020-12-09
Payer: COMMERCIAL

## 2020-12-09 PROCEDURE — 99211 OFF/OP EST MAY X REQ PHY/QHP: CPT | Performed by: NURSE PRACTITIONER

## 2020-12-10 LAB — SARS-COV-2, NAA: NOT DETECTED

## 2020-12-11 ENCOUNTER — VIRTUAL VISIT (OUTPATIENT)
Dept: INTERNAL MEDICINE CLINIC | Age: 50
End: 2020-12-11
Payer: COMMERCIAL

## 2020-12-11 ENCOUNTER — NURSE TRIAGE (OUTPATIENT)
Dept: OTHER | Facility: CLINIC | Age: 50
End: 2020-12-11

## 2020-12-11 PROCEDURE — 99213 OFFICE O/P EST LOW 20 MIN: CPT | Performed by: INTERNAL MEDICINE

## 2020-12-11 RX ORDER — DEXTROMETHORPHAN HYDROBROMIDE AND PROMETHAZINE HYDROCHLORIDE 15; 6.25 MG/5ML; MG/5ML
5 SYRUP ORAL 4 TIMES DAILY PRN
Qty: 240 ML | Refills: 0 | Status: SHIPPED | OUTPATIENT
Start: 2020-12-11 | End: 2020-12-18

## 2020-12-11 RX ORDER — PREDNISONE 50 MG/1
50 TABLET ORAL DAILY
Qty: 5 TABLET | Refills: 0 | Status: SHIPPED | OUTPATIENT
Start: 2020-12-11 | End: 2020-12-16

## 2020-12-11 RX ORDER — ALBUTEROL SULFATE 90 UG/1
2 AEROSOL, METERED RESPIRATORY (INHALATION) 4 TIMES DAILY PRN
Qty: 1 INHALER | Refills: 0 | Status: SHIPPED | OUTPATIENT
Start: 2020-12-11 | End: 2022-07-12

## 2020-12-11 RX ORDER — CETIRIZINE HYDROCHLORIDE 10 MG/1
10 TABLET ORAL DAILY
Qty: 30 TABLET | Refills: 1 | Status: SHIPPED | OUTPATIENT
Start: 2020-12-11 | End: 2021-04-19

## 2020-12-11 SDOH — SOCIAL STABILITY: SOCIAL NETWORK: ARE YOU MARRIED, WIDOWED, DIVORCED, SEPARATED, NEVER MARRIED, OR LIVING WITH A PARTNER?: MARRIED

## 2020-12-11 SDOH — SOCIAL STABILITY: SOCIAL INSECURITY: WITHIN THE LAST YEAR, HAVE YOU BEEN AFRAID OF YOUR PARTNER OR EX-PARTNER?: NO

## 2020-12-11 SDOH — SOCIAL STABILITY: SOCIAL NETWORK
DO YOU BELONG TO ANY CLUBS OR ORGANIZATIONS SUCH AS CHURCH GROUPS UNIONS, FRATERNAL OR ATHLETIC GROUPS, OR SCHOOL GROUPS?: NO

## 2020-12-11 SDOH — SOCIAL STABILITY: SOCIAL NETWORK: HOW OFTEN DO YOU GET TOGETHER WITH FRIENDS OR RELATIVES?: MORE THAN THREE TIMES A WEEK

## 2020-12-11 SDOH — SOCIAL STABILITY: SOCIAL INSECURITY: WITHIN THE LAST YEAR, HAVE YOU BEEN HUMILIATED OR EMOTIONALLY ABUSED IN OTHER WAYS BY YOUR PARTNER OR EX-PARTNER?: NO

## 2020-12-11 SDOH — SOCIAL STABILITY: SOCIAL INSECURITY
WITHIN THE LAST YEAR, HAVE TO BEEN RAPED OR FORCED TO HAVE ANY KIND OF SEXUAL ACTIVITY BY YOUR PARTNER OR EX-PARTNER?: NO

## 2020-12-11 SDOH — SOCIAL STABILITY: SOCIAL NETWORK
IN A TYPICAL WEEK, HOW MANY TIMES DO YOU TALK ON THE PHONE WITH FAMILY, FRIENDS, OR NEIGHBORS?: MORE THAN THREE TIMES A WEEK

## 2020-12-11 SDOH — ECONOMIC STABILITY: INCOME INSECURITY: HOW HARD IS IT FOR YOU TO PAY FOR THE VERY BASICS LIKE FOOD, HOUSING, MEDICAL CARE, AND HEATING?: NOT VERY HARD

## 2020-12-11 SDOH — SOCIAL STABILITY: SOCIAL INSECURITY
WITHIN THE LAST YEAR, HAVE YOU BEEN KICKED, HIT, SLAPPED, OR OTHERWISE PHYSICALLY HURT BY YOUR PARTNER OR EX-PARTNER?: NO

## 2020-12-11 SDOH — SOCIAL STABILITY: SOCIAL NETWORK: HOW OFTEN DO YOU ATTENT MEETINGS OF THE CLUB OR ORGANIZATION YOU BELONG TO?: NEVER

## 2020-12-11 SDOH — SOCIAL STABILITY: SOCIAL NETWORK: HOW OFTEN DO YOU ATTEND CHURCH OR RELIGIOUS SERVICES?: NEVER

## 2020-12-11 SDOH — ECONOMIC STABILITY: TRANSPORTATION INSECURITY
IN THE PAST 12 MONTHS, HAS THE LACK OF TRANSPORTATION KEPT YOU FROM MEDICAL APPOINTMENTS OR FROM GETTING MEDICATIONS?: NO

## 2020-12-11 SDOH — ECONOMIC STABILITY: FOOD INSECURITY: WITHIN THE PAST 12 MONTHS, YOU WORRIED THAT YOUR FOOD WOULD RUN OUT BEFORE YOU GOT MONEY TO BUY MORE.: NEVER TRUE

## 2020-12-11 SDOH — ECONOMIC STABILITY: TRANSPORTATION INSECURITY
IN THE PAST 12 MONTHS, HAS LACK OF TRANSPORTATION KEPT YOU FROM MEETINGS, WORK, OR FROM GETTING THINGS NEEDED FOR DAILY LIVING?: NO

## 2020-12-11 SDOH — ECONOMIC STABILITY: FOOD INSECURITY: WITHIN THE PAST 12 MONTHS, THE FOOD YOU BOUGHT JUST DIDN'T LAST AND YOU DIDN'T HAVE MONEY TO GET MORE.: NEVER TRUE

## 2020-12-11 ASSESSMENT — ENCOUNTER SYMPTOMS
SORE THROAT: 0
HEARTBURN: 0
NAUSEA: 0
VOMITING: 0
COUGH: 1
RHINORRHEA: 0
ABDOMINAL PAIN: 0
WHEEZING: 0
HEMOPTYSIS: 0
SHORTNESS OF BREATH: 0

## 2020-12-11 NOTE — PROGRESS NOTES
Subjective:      Patient ID: Shaquille Chi is a 53 y.o. female. Cough   This is a new problem. The current episode started in the past 7 days. The problem has been gradually worsening. The problem occurs constantly. The cough is non-productive. Associated symptoms include a fever, headaches, myalgias and nasal congestion. Pertinent negatives include no chest pain, chills, ear congestion, ear pain, heartburn, hemoptysis, postnasal drip, rash, rhinorrhea, sore throat, shortness of breath, sweats, weight loss or wheezing. Associated symptoms comments: Chest burining. She has tried OTC cough suppressant and body position changes for the symptoms. The treatment provided mild relief. There is no history of asthma, bronchiectasis or bronchitis. Fever    This is a new problem. The current episode started yesterday. The problem has been unchanged. The maximum temperature noted was 99 to 99.9 F. The temperature was taken using an axillary reading. Associated symptoms include coughing, headaches and muscle aches. Pertinent negatives include no abdominal pain, chest pain, congestion, ear pain, nausea, rash, sleepiness, sore throat, urinary pain, vomiting or wheezing. Review of Systems   Constitutional: Positive for fever. Negative for chills and weight loss. HENT: Negative for congestion, ear pain, postnasal drip, rhinorrhea and sore throat. Respiratory: Positive for cough. Negative for hemoptysis, shortness of breath and wheezing. Cardiovascular: Negative for chest pain. Gastrointestinal: Negative for abdominal pain, heartburn, nausea and vomiting. Genitourinary: Negative for dysuria. Musculoskeletal: Positive for myalgias. Skin: Negative for rash. Neurological: Positive for headaches. All other systems reviewed and are negative.       No Known Allergies    Current Outpatient Medications   Medication Sig Dispense Refill    ibuprofen (ADVIL;MOTRIN) 800 MG tablet TAKE 1 TABLET BY MOUTH EVERY 8 HOURS alert and oriented to person, place, and time. Mental status is at baseline. Psychiatric:         Mood and Affect: Mood normal.         Behavior: Behavior normal.         Thought Content: Thought content normal.         Judgment: Judgment normal.       Assessment/Plan:  Clark Zhao was seen today for cough and fever. Diagnoses and all orders for this visit:    Cough in adult  -     promethazine-dextromethorphan (PROMETHAZINE-DM) 6.25-15 MG/5ML syrup; Take 5 mLs by mouth 4 times daily as needed for Cough  -     albuterol sulfate HFA (VENTOLIN HFA) 108 (90 Base) MCG/ACT inhaler; Inhale 2 puffs into the lungs 4 times daily as needed for Wheezing  -     cetirizine (ZYRTEC ALLERGY) 10 MG tablet; Take 1 tablet by mouth daily  -     predniSONE (DELTASONE) 50 MG tablet; Take 1 tablet by mouth daily for 5 days    concern for unknown asthma/copd  exacerbation, if worsening SOB go to ER. Pending Dagmar Shen MD     Clark Zhao A Day is a 53 y.o. female being evaluated by a Virtual Visit (video visit) encounter to address concerns as mentioned above. A caregiver was present when appropriate. Due to this being a TeleHealth encounter (During HWKAV-06 public health emergency), evaluation of the following organ systems was limited: Vitals/Constitutional/EENT/Resp/CV/GI//MS/Neuro/Skin/Heme-Lymph-Imm. Pursuant to the emergency declaration under the [facility], [address] [place] [address] and the [employer] and [employer] Act, this Virtual Visit was conducted with patient's (and/or legal guardian's) consent, to reduce the patient's risk of exposure to COVID-19 and provide necessary medical care. The patient (and/or legal guardian) has also been advised to contact this office for worsening conditions or problems, and seek emergency medical treatment and/or call 911 if deemed necessary.      Patient identification was verified at the start of the visit: Yes    Total time spent for this encounter: Not billed by time    Services were provided through a video synchronous discussion virtually to substitute for in-person clinic visit. Patient and provider were located at their individual homes. --Sanchez Kelsey MD on 12/11/2020 at 1:17 PM    An electronic signature was used to authenticate this note.

## 2020-12-11 NOTE — TELEPHONE ENCOUNTER
Reason for Disposition   [1] COVID-19 infection suspected by caller or triager AND [2] mild symptoms (cough, fever, or others) AND [1] no complications or SOB    Answer Assessment - Initial Assessment Questions  1. COVID-19 DIAGNOSIS: \"Who made your Coronavirus (COVID-19) diagnosis? \" \"Was it confirmed by a positive lab test?\" If not diagnosed by a HCP, ask \"Are there lots of cases (community spread) where you live? \" (See public health department website, if unsure)      Covid test pending (taken on 12/7)  2. COVID-19 EXPOSURE: \"Was there any known exposure to COVID before the symptoms began? \" CDC Definition of close contact: within 6 feet (2 meters) for a total of 15 minutes or more over a 24-hour period. unknown  3. ONSET: \"When did the COVID-19 symptoms start? \"       Onset was 12/07  4. WORST SYMPTOM: \"What is your worst symptom? \" (e.g., cough, fever, shortness of breath, muscle aches)      Dry cough - causing a headache - keeping her awake at night  5. COUGH: \"Do you have a cough? \" If so, ask: \"How bad is the cough? \"        See item 4  6. FEVER: \"Do you have a fever? \" If so, ask: \"What is your temperature, how was it measured, and when did it start? \"      Yesterday fever was 99.6 (now resolved)  7. RESPIRATORY STATUS: \"Describe your breathing? \" (e.g., shortness of breath, wheezing, unable to speak)       Shortness of air with cough  8. BETTER-SAME-WORSE: Kailanicole Reinoso you getting better, staying the same or getting worse compared to yesterday? \"  If getting worse, ask, \"In what way? \"      same  9. HIGH RISK DISEASE: \"Do you have any chronic medical problems? \" (e.g., asthma, heart or lung disease, weak immune system, etc.)      No  10. PREGNANCY: \"Is there any chance you are pregnant? \" \"When was your last menstrual period? \"        No  11. OTHER SYMPTOMS: \"Do you have any other symptoms? \"  (e.g., chills, fatigue, headache, loss of smell or taste, muscle pain, sore throat)        Dry cough, nasal congestion and drainage (improving), fever (now resolved, headache, sneezing and runny nose. Protocols used: CORONAVIRUS (XADCV-36)  DIAGNOSED OR SUSPECTED-ADULT-OH    Received call from [address]. Call soft transferred to [address] to schedule appointment. Attention Provider: Thank you for allowing me to participate in the care of your patient. The  patient was connected to triage in response to information provided to the [facility]. Please do not respond through this encounter as the response is not directed to a shared pool. Patient reports covid like symptoms: nasal congestion, dry persistent cough, fever (now resolved), headache, sneezing and runny nose. Patient also reports shortness of breath when coughing. Patient would like to be seen to treat the persistent cough that is keeping her from resting. Care advice provided. Warm transfer to Zahida Bloom at the service center for physician contact/appointment scheduling.

## 2021-04-17 DIAGNOSIS — R05.9 COUGH IN ADULT: ICD-10-CM

## 2021-04-19 RX ORDER — CETIRIZINE HYDROCHLORIDE 10 MG/1
10 TABLET ORAL DAILY
Qty: 30 TABLET | Refills: 0 | Status: SHIPPED | OUTPATIENT
Start: 2021-04-19 | End: 2021-05-19

## 2021-05-19 DIAGNOSIS — R05.9 COUGH IN ADULT: ICD-10-CM

## 2021-05-19 RX ORDER — CETIRIZINE HYDROCHLORIDE 10 MG/1
10 TABLET ORAL DAILY
Qty: 90 TABLET | Refills: 1 | Status: SHIPPED | OUTPATIENT
Start: 2021-05-19 | End: 2022-07-12

## 2021-08-31 ENCOUNTER — VIRTUAL VISIT (OUTPATIENT)
Dept: INTERNAL MEDICINE CLINIC | Age: 51
End: 2021-08-31
Payer: COMMERCIAL

## 2021-08-31 DIAGNOSIS — U07.1 COVID-19: Primary | ICD-10-CM

## 2021-08-31 PROCEDURE — 99214 OFFICE O/P EST MOD 30 MIN: CPT | Performed by: INTERNAL MEDICINE

## 2021-08-31 RX ORDER — DEXTROMETHORPHAN HYDROBROMIDE AND PROMETHAZINE HYDROCHLORIDE 15; 6.25 MG/5ML; MG/5ML
5 SYRUP ORAL 4 TIMES DAILY PRN
Qty: 240 ML | Refills: 0 | Status: SHIPPED | OUTPATIENT
Start: 2021-08-31 | End: 2021-09-07

## 2021-08-31 ASSESSMENT — ENCOUNTER SYMPTOMS
VOMITING: 0
ABDOMINAL PAIN: 0
NAUSEA: 0
ALLERGIC/IMMUNOLOGIC NEGATIVE: 1
COUGH: 1
SINUS PAIN: 0
SWOLLEN GLANDS: 0
RHINORRHEA: 1
SORE THROAT: 0
WHEEZING: 1
DIARRHEA: 0
EYES NEGATIVE: 1

## 2021-08-31 ASSESSMENT — PATIENT HEALTH QUESTIONNAIRE - PHQ9
1. LITTLE INTEREST OR PLEASURE IN DOING THINGS: 0
2. FEELING DOWN, DEPRESSED OR HOPELESS: 0
SUM OF ALL RESPONSES TO PHQ QUESTIONS 1-9: 0
SUM OF ALL RESPONSES TO PHQ9 QUESTIONS 1 & 2: 0

## 2021-08-31 NOTE — PROGRESS NOTES
Lisa Chi (:  1970) is a 49 y.o. female,Established patient, here for evaluation of the following chief complaint(s):  Positive For Covid-19          Assessment/Plan:  Brea Sprague was seen today for positive for covid-19. Diagnoses and all orders for this visit:    COVID-19  -     promethazine-dextromethorphan (PROMETHAZINE-DM) 6.25-15 MG/5ML syrup; Take 5 mLs by mouth 4 times daily as needed for Cough    - supportive care  - RTW 2021        Subjective   SUBJECTIVE/OBJECTIVE:  URI   This is a new (COVID TEST on  negative --  postiive) problem. The current episode started in the past 7 days. The problem has been waxing and waning. The maximum temperature recorded prior to her arrival was 102 - 102.9 F. The fever has been present for 5 days or more. Associated symptoms include congestion, coughing, headaches, rhinorrhea and wheezing. Pertinent negatives include no abdominal pain, chest pain, diarrhea, dysuria, ear pain, joint pain, joint swelling, nausea, neck pain, plugged ear sensation, rash, sinus pain, sneezing, sore throat, swollen glands or vomiting. She has tried acetaminophen, increased fluids and NSAIDs for the symptoms. The treatment provided significant relief. Review of Systems   Constitutional: Positive for appetite change, fatigue and fever. HENT: Positive for congestion and rhinorrhea. Negative for ear pain, sinus pain, sneezing and sore throat. Loss of taste and smell   Eyes: Negative. Respiratory: Positive for cough and wheezing. Cardiovascular: Negative for chest pain. Gastrointestinal: Negative for abdominal pain, diarrhea, nausea and vomiting. Endocrine: Negative. Genitourinary: Negative for dysuria. Musculoskeletal: Positive for myalgias. Negative for joint pain and neck pain. Skin: Negative for rash. Allergic/Immunologic: Negative. Neurological: Positive for headaches. Hematological: Negative. Psychiatric/Behavioral: Negative. Objective   Physical Exam  Vitals and nursing note reviewed. Constitutional:       General: She is not in acute distress. Appearance: She is well-developed. She is obese. HENT:      Head: Normocephalic and atraumatic. Nose: Congestion and rhinorrhea present. Eyes:      Pupils: Pupils are equal, round, and reactive to light. Pulmonary:      Effort: Pulmonary effort is normal.   Skin:     General: Skin is warm. Capillary Refill: Capillary refill takes less than 2 seconds. Neurological:      General: No focal deficit present. Mental Status: She is alert and oriented to person, place, and time. Mental status is at baseline. Psychiatric:         Mood and Affect: Mood normal.         Behavior: Behavior normal.         Thought Content: Thought content normal.         Judgment: Judgment normal.            On this date 8/31/2021 I have spent 15 minutes reviewing previous notes, test results and face to face with the patient discussing the diagnosis and importance of compliance with the treatment plan as well as documenting on the day of the visit. An electronic signature was used to authenticate this note. --MD Jean Howardias Jonas A Day, was evaluated through a synchronous (real-time) audio-video encounter. The patient (or guardian if applicable) is aware that this is a billable service. Verbal consent to proceed has been obtained within the past 12 months. The visit was conducted pursuant to the emergency declaration under the [address] [place] [address], [address] [place] [address] authority and the [employer] Act. Patient identification was verified, and a caregiver was present when appropriate. The patient was located in a state where the provider was credentialed to provide care.     Total time spent for this encounter: 15    --Roseann Dukes MD on 8/31/2021 at 12:00 PM    An electronic signature was used to authenticate this note.

## 2021-09-14 ENCOUNTER — TELEPHONE (OUTPATIENT)
Dept: INTERNAL MEDICINE CLINIC | Age: 51
End: 2021-09-14

## 2021-09-14 NOTE — TELEPHONE ENCOUNTER
----- Message from [facility] sent at 9/14/2021  1:12 PM EDT -----  Subject: Message to Provider    QUESTIONS  Information for Provider? Patient is calling checking on short term   disability to fill out and is also she said she was to go back to work on   13th and she is still not feeling well please call back to discuss   paperwork status and when she can return to work  ---------------------------------------------------------------------------  --------------  [address]  What is the best way for the office to contact you? OK to leave message on   voicemail  Preferred Call Back Phone Number? [phone]  ---------------------------------------------------------------------------  --------------  SCRIPT ANSWERS  Relationship to Patient?  Self

## 2021-09-15 NOTE — TELEPHONE ENCOUNTER
Please Advise      Pt checking on FMLA and she said she can't return on the 13th she is still sick she would like to try to go back on 9/20/21r

## 2021-09-28 ENCOUNTER — TELEPHONE (OUTPATIENT)
Dept: INTERNAL MEDICINE CLINIC | Age: 51
End: 2021-09-28

## 2021-09-28 NOTE — TELEPHONE ENCOUNTER
Please Advise      Pt said her job never received FMLA forms     [phone]    Claim # [id number]

## 2022-07-11 ENCOUNTER — TELEPHONE (OUTPATIENT)
Dept: INTERNAL MEDICINE CLINIC | Age: 52
End: 2022-07-11

## 2022-07-11 NOTE — TELEPHONE ENCOUNTER
Lisa called back to see if there was another slot for her. She needs at least 24 hours before the appointment to make arrangements. The earliest or the latest would be best.

## 2022-07-11 NOTE — TELEPHONE ENCOUNTER
[facility] put red flag urgent call through for patient complaining of neck pain and tailbone pain due to multiple falls over the last couple of weeks.

## 2022-07-12 ENCOUNTER — HOSPITAL ENCOUNTER (OUTPATIENT)
Age: 52
Discharge: HOME | End: 2022-07-12
Payer: COMMERCIAL

## 2022-07-12 ENCOUNTER — OFFICE VISIT (OUTPATIENT)
Dept: INTERNAL MEDICINE CLINIC | Age: 52
End: 2022-07-12
Payer: COMMERCIAL

## 2022-07-12 ENCOUNTER — HOSPITAL ENCOUNTER (OUTPATIENT)
Dept: GENERAL RADIOLOGY | Age: 52
Discharge: HOME | End: 2022-07-12
Payer: COMMERCIAL

## 2022-07-12 VITALS
HEART RATE: 75 BPM | BODY MASS INDEX: 34.98 KG/M2 | OXYGEN SATURATION: 99 % | WEIGHT: 230.8 LBS | DIASTOLIC BLOOD PRESSURE: 70 MMHG | SYSTOLIC BLOOD PRESSURE: 100 MMHG | HEIGHT: 68 IN

## 2022-07-12 DIAGNOSIS — M79.671 PAIN OF BOTH HEELS: ICD-10-CM

## 2022-07-12 DIAGNOSIS — Z13.9 ENCOUNTER FOR HEALTH-RELATED SCREENING: ICD-10-CM

## 2022-07-12 DIAGNOSIS — M54.2 BILATERAL POSTERIOR NECK PAIN: ICD-10-CM

## 2022-07-12 DIAGNOSIS — M54.50 LOW BACK PAIN, UNSPECIFIED BACK PAIN LATERALITY, UNSPECIFIED CHRONICITY, UNSPECIFIED WHETHER SCIATICA PRESENT: ICD-10-CM

## 2022-07-12 DIAGNOSIS — M25.59 PAIN IN OTHER JOINT: ICD-10-CM

## 2022-07-12 DIAGNOSIS — M79.672 PAIN OF BOTH HEELS: ICD-10-CM

## 2022-07-12 DIAGNOSIS — M53.3 TAIL BONE PAIN: ICD-10-CM

## 2022-07-12 DIAGNOSIS — M62.838 MUSCLE SPASM: ICD-10-CM

## 2022-07-12 DIAGNOSIS — G47.33 OSA (OBSTRUCTIVE SLEEP APNEA): ICD-10-CM

## 2022-07-12 DIAGNOSIS — M53.3 TAIL BONE PAIN: Primary | ICD-10-CM

## 2022-07-12 LAB
A/G RATIO: 1.6 (ref 1.1–2.2)
ALBUMIN SERPL-MCNC: 4.2 G/DL (ref 3.4–5)
ALP BLD-CCNC: 78 U/L (ref 40–129)
ALT SERPL-CCNC: 14 U/L (ref 10–40)
ANION GAP SERPL CALCULATED.3IONS-SCNC: 10 MMOL/L (ref 3–16)
AST SERPL-CCNC: 16 U/L (ref 15–37)
BASOPHILS ABSOLUTE: 0.1 K/UL (ref 0–0.2)
BASOPHILS RELATIVE PERCENT: 1.4 %
BILIRUB SERPL-MCNC: 0.4 MG/DL (ref 0–1)
BUN BLDV-MCNC: 11 MG/DL (ref 7–20)
CALCIUM SERPL-MCNC: 9.2 MG/DL (ref 8.3–10.6)
CHLORIDE BLD-SCNC: 102 MMOL/L (ref 99–110)
CHOLESTEROL, TOTAL: 218 MG/DL (ref 0–199)
CO2: 24 MMOL/L (ref 21–32)
CREAT SERPL-MCNC: 0.7 MG/DL (ref 0.6–1.1)
EOSINOPHILS ABSOLUTE: 0.2 K/UL (ref 0–0.6)
EOSINOPHILS RELATIVE PERCENT: 3.1 %
GFR AFRICAN AMERICAN: >60
GFR NON-AFRICAN AMERICAN: >60
GLUCOSE BLD-MCNC: 85 MG/DL (ref 70–99)
HCT VFR BLD CALC: 40.3 % (ref 36–48)
HDLC SERPL-MCNC: 89 MG/DL (ref 40–60)
HEMOGLOBIN: 13.6 G/DL (ref 12–16)
LDL CHOLESTEROL CALCULATED: 113 MG/DL
LYMPHOCYTES ABSOLUTE: 1.5 K/UL (ref 1–5.1)
LYMPHOCYTES RELATIVE PERCENT: 23.7 %
MCH RBC QN AUTO: 29.6 PG (ref 26–34)
MCHC RBC AUTO-ENTMCNC: 33.6 G/DL (ref 31–36)
MCV RBC AUTO: 87.9 FL (ref 80–100)
MONOCYTES ABSOLUTE: 0.5 K/UL (ref 0–1.3)
MONOCYTES RELATIVE PERCENT: 8.7 %
NEUTROPHILS ABSOLUTE: 4 K/UL (ref 1.7–7.7)
NEUTROPHILS RELATIVE PERCENT: 63.1 %
PDW BLD-RTO: 14.8 % (ref 12.4–15.4)
PLATELET # BLD: 219 K/UL (ref 135–450)
PMV BLD AUTO: 9.7 FL (ref 5–10.5)
POTASSIUM SERPL-SCNC: 4.8 MMOL/L (ref 3.5–5.1)
RBC # BLD: 4.59 M/UL (ref 4–5.2)
SEDIMENTATION RATE, ERYTHROCYTE: 24 MM/HR (ref 0–30)
SODIUM BLD-SCNC: 136 MMOL/L (ref 136–145)
TOTAL PROTEIN: 6.9 G/DL (ref 6.4–8.2)
TRIGL SERPL-MCNC: 82 MG/DL (ref 0–150)
TSH REFLEX FT4: 3.16 UIU/ML (ref 0.27–4.2)
VITAMIN D 25-HYDROXY: 30.2 NG/ML
VLDLC SERPL CALC-MCNC: 16 MG/DL
WBC # BLD: 6.3 K/UL (ref 4–11)

## 2022-07-12 PROCEDURE — 72220 X-RAY EXAM SACRUM TAILBONE: CPT

## 2022-07-12 PROCEDURE — 72040 X-RAY EXAM NECK SPINE 2-3 VW: CPT

## 2022-07-12 PROCEDURE — 85025 COMPLETE CBC W/AUTO DIFF WBC: CPT

## 2022-07-12 PROCEDURE — 86038 ANTINUCLEAR ANTIBODIES: CPT

## 2022-07-12 PROCEDURE — 80061 LIPID PANEL: CPT

## 2022-07-12 PROCEDURE — 36415 COLL VENOUS BLD VENIPUNCTURE: CPT

## 2022-07-12 PROCEDURE — 85652 RBC SED RATE AUTOMATED: CPT

## 2022-07-12 PROCEDURE — 86200 CCP ANTIBODY: CPT

## 2022-07-12 PROCEDURE — 72114 X-RAY EXAM L-S SPINE BENDING: CPT

## 2022-07-12 PROCEDURE — 84443 ASSAY THYROID STIM HORMONE: CPT

## 2022-07-12 PROCEDURE — 80053 COMPREHEN METABOLIC PANEL: CPT

## 2022-07-12 PROCEDURE — 99214 OFFICE O/P EST MOD 30 MIN: CPT | Performed by: INTERNAL MEDICINE

## 2022-07-12 PROCEDURE — 82306 VITAMIN D 25 HYDROXY: CPT

## 2022-07-12 PROCEDURE — 83036 HEMOGLOBIN GLYCOSYLATED A1C: CPT

## 2022-07-12 SDOH — ECONOMIC STABILITY: FOOD INSECURITY: WITHIN THE PAST 12 MONTHS, THE FOOD YOU BOUGHT JUST DIDN'T LAST AND YOU DIDN'T HAVE MONEY TO GET MORE.: NEVER TRUE

## 2022-07-12 SDOH — ECONOMIC STABILITY: FOOD INSECURITY: WITHIN THE PAST 12 MONTHS, YOU WORRIED THAT YOUR FOOD WOULD RUN OUT BEFORE YOU GOT MONEY TO BUY MORE.: NEVER TRUE

## 2022-07-12 ASSESSMENT — ENCOUNTER SYMPTOMS
BACK PAIN: 1
ALLERGIC/IMMUNOLOGIC NEGATIVE: 1
GASTROINTESTINAL NEGATIVE: 1
EYES NEGATIVE: 1
RESPIRATORY NEGATIVE: 1

## 2022-07-12 ASSESSMENT — PATIENT HEALTH QUESTIONNAIRE - PHQ9
SUM OF ALL RESPONSES TO PHQ QUESTIONS 1-9: 0
1. LITTLE INTEREST OR PLEASURE IN DOING THINGS: 0
SUM OF ALL RESPONSES TO PHQ9 QUESTIONS 1 & 2: 0
2. FEELING DOWN, DEPRESSED OR HOPELESS: 0
SUM OF ALL RESPONSES TO PHQ QUESTIONS 1-9: 0

## 2022-07-12 ASSESSMENT — SOCIAL DETERMINANTS OF HEALTH (SDOH): HOW HARD IS IT FOR YOU TO PAY FOR THE VERY BASICS LIKE FOOD, HOUSING, MEDICAL CARE, AND HEATING?: NOT HARD AT ALL

## 2022-07-12 NOTE — PROGRESS NOTES
Lisa Chi (:  1970) is a 47 y.o. female,Established patient, here for evaluation of the following chief complaint(s):  Pain (neck tail bone pain )    Assessment/Plan:  Regi Alfredo was seen today for pain. Diagnoses and all orders for this visit:    Tail bone pain  -     XR CERVICAL SPINE FLEXION AND EXTENSION; Future  -     XR SACRUM COCCYX (MIN 2 VIEWS); Future  -      [facility] - Physical Therapy  -     XR LUMBOSACRAL W OBLIQUES AND FLEXION AND EXTENSION; Future  -     diclofenac (VOLTAREN) 50 MG EC tablet; Take 1 tablet by mouth 2 times daily as needed for Pain    Bilateral posterior neck pain  -     XR CERVICAL SPINE (2-3 VIEWS); Future  -      [facility] - Physical Therapy  -     XR LUMBOSACRAL W OBLIQUES AND FLEXION AND EXTENSION; Future  -     diclofenac (VOLTAREN) 50 MG EC tablet; Take 1 tablet by mouth 2 times daily as needed for Pain    Low back pain, unspecified back pain laterality, unspecified chronicity, unspecified whether sciatica present  -     XR LUMBOSACRAL W OBLIQUES AND FLEXION AND EXTENSION; Future    NETO (obstructive sleep apnea)  - encourage use of CPAP daily    Encounter for health-related screening  -     TSH with Reflex to FT4; Future  -     Comprehensive Metabolic Panel; Future  -     Hemoglobin A1C; Future  -     Lipid Panel; Future    Pain of both heels  -     Peggy - Grace Del Cid MD, Orthopedic Surgery, [facility]  -     Vitamin D 25 Hydroxy; Future    Muscle spasm  - referral to PT      Pain in other joint  -     Vitamin D 25 Hydroxy; Future  -     PENNY Reflex to Antibody Cascade; Future  -     Cyclic Citrul Peptide Antibody, IgG; Future  -     Sedimentation Rate; Future  -     CBC with Auto Differential; Future             Subjective   SUBJECTIVE/OBJECTIVE:  HPI    Patient with significant disability(not exercising/limiting activity due to pain).  All fall were in unsafe condition wet grass, slippery tarp son stairs, socks with laundry basket on stairs). Last september she slipped down the steps. March slipped down steps. She reports pain because she sits all day. April she was taking the trash out and she has pain. She complains of pain in her neck and in her tail bone. Joint Symptoms:  Patient complains of a 7 month history of pain, stiffness and decreased mobility in tail bone, low back and neck. Pain is persistent, aching and tight in nature, and is moderate in intensity. Radiation: none. Associated symptoms:  none. She denies weakness, paresthesia, fevers, night sweats, unintentional weight loss, fatigue, headache, rash, mucocutaneous ulcers, Raynaud's phenomenon, abdominal pain, dysphagia, change in bowel or bladder habits, nausea, vomiting and visual change. Symptoms are exacerbated by inactivity. Precipitating factors: fall- x3. Symptoms are worse at no particular time. Prior history of similar symptoms: none. Previous treatment: none, rest.  Symptoms show no change over time.     Past Medical History:   Diagnosis Date    Broken tooth 2018    upper right    Capsulitis of shoulder, left     Dental bridge present     Dental crowns present     Migraine without status migrainosus, not intractable 2016    Sleep apnea 2016    uses CPAP sporatically      Past Surgical History:   Procedure Laterality Date     SECTION      x1    OTHER SURGICAL HISTORY Left 2018    Left Shoulder Manipulation/ Steroid Injection     Family History   Problem Relation Age of Onset    Other Sister         NETO    High Blood Pressure Father     Other Father         NETO    COPD Father     Heart Disease Father      Social History     Socioeconomic History    Marital status:      Spouse name: Not on file    Number of children: 1    Years of education: Not on file    Highest education level: Not on file   Occupational History    Occupation: Senior    Tobacco Use    Smoking status: Former Smoker Packs/day: 0.25     Types: Cigarettes     Quit date: 2020     Years since quittin.4    Smokeless tobacco: Current User    Tobacco comment: 1 pk/wk   Substance and Sexual Activity    Alcohol use: Yes     Comment: social    Drug use: No    Sexual activity: Yes     Partners: Male   Other Topics Concern    Not on file   Social History Narrative    Lives home with  and daughter. She has an inherited Tarantula a 36year old tarantula. Social Determinants of Health     Financial Resource Strain: Low Risk     Difficulty of Paying Living Expenses: Not hard at all   Food Insecurity: No Food Insecurity    Worried About Running Out of Food in the Last Year: Never true    [place] of Food in the Last Year: Never true   Transportation Needs:     Lack of Transportation (Medical): Not on file    Lack of Transportation (Non-Medical): Not on file   Physical Activity:     Days of Exercise per Week: Not on file    Minutes of Exercise per Session: Not on file   Stress:     Feeling of Stress : Not on file   Social Connections:     Frequency of Communication with Friends and Family: Not on file    Frequency of Social Gatherings with Friends and Family: Not on file    Attends Muslim Services: Not on file    Active Member of [address] [place] [address] [employer] or Organizations: Not on file    Attends Club or Organization Meetings: Not on file    Marital Status: Not on file   Intimate Partner Violence:     Fear of Current or Ex-Partner: Not on file    Emotionally Abused: Not on file    Physically Abused: Not on file    Sexually Abused: Not on file   Housing Stability:     Unable to Pay for Housing in the Last Year: Not on file    Number of [place] in the Last Year: Not on file    Unstable Housing in the Last Year: Not on file       Review of Systems   Constitutional: Negative. Eyes: Negative. Respiratory: Negative. Cardiovascular: Negative. Gastrointestinal: Negative. Endocrine: Negative. Musculoskeletal: Positive for arthralgias, back pain, neck pain and neck stiffness. Allergic/Immunologic: Negative. Neurological: Negative. Hematological: Negative. Psychiatric/Behavioral: Negative. All other systems reviewed and are negative. No Known Allergies    Current Outpatient Medications   Medication Sig Dispense Refill    ibuprofen (ADVIL;MOTRIN) 800 MG tablet TAKE 1 TABLET BY MOUTH EVERY 8 HOURS AS NEEDED FOR PAIN 90 tablet 0    albuterol sulfate HFA (PROVENTIL HFA) 108 (90 Base) MCG/ACT inhaler Inhale 2 puffs into the lungs every 4 hours as needed for Wheezing 18 g 3    cetirizine (ZYRTEC) 10 MG tablet TAKE 1 TABLET BY MOUTH DAILY (Patient not taking: Reported on 7/12/2022) 90 tablet 1    albuterol sulfate HFA (VENTOLIN HFA) 108 (90 Base) MCG/ACT inhaler Inhale 2 puffs into the lungs 4 times daily as needed for Wheezing 1 Inhaler 0    dextromethorphan (DELSYM) 30 MG/5ML extended release liquid Take 10 mLs by mouth 2 times daily as needed for Cough Rinse mouth daily  Keep mouth moist 148 mL 0    traMADol (ULTRAM) 50 MG tablet Take 50 mg by mouth every 6 hours as needed for Pain. Alma Cho naproxen (NAPROSYN) 500 MG tablet Take 1 tablet by mouth 2 times daily 60 tablet 0    butalbital-acetaminophen-caffeine (FIORICET, ESGIC) -40 MG per tablet Take 1 tablet by mouth every 6 hours as needed for Headaches 180 tablet 3     No current facility-administered medications for this visit. Vitals:    07/12/22 0852   BP: 100/70   Site: Right Upper Arm   Position: Sitting   Cuff Size: Large Adult   Pulse: 75   SpO2: 99%   Weight: 230 lb 12.8 oz (104.7 kg)   Height: 5' 8\" (1.727 m)     Body mass index is 35.09 kg/m².      Wt Readings from Last 3 Encounters:   07/12/22 230 lb 12.8 oz (104.7 kg)   06/05/18 198 lb (89.8 kg)   05/09/18 198 lb (89.8 kg)     BP Readings from Last 3 Encounters:   07/12/22 100/70   06/05/18 100/67   05/09/18 (!) 142/85       Objective   Physical Exam  Vitals and nursing note reviewed. Constitutional:       General: She is not in acute distress. Appearance: Normal appearance. She is well-developed. She is not diaphoretic. HENT:      Head: Normocephalic and atraumatic. Right Ear: Hearing, tympanic membrane, ear canal and external ear normal.      Left Ear: Hearing, tympanic membrane, ear canal and external ear normal.      Nose: Rhinorrhea present. No nasal deformity, laceration or mucosal edema. Right Sinus: No maxillary sinus tenderness or frontal sinus tenderness. Left Sinus: No maxillary sinus tenderness or frontal sinus tenderness. Mouth/Throat:      Pharynx: Uvula midline. No oropharyngeal exudate. Eyes:      General: Lids are normal.         Right eye: No discharge. Left eye: No discharge. Conjunctiva/sclera: Conjunctivae normal.      Pupils: Pupils are equal, round, and reactive to light. Neck:      Thyroid: No thyroid mass or thyromegaly. Vascular: Normal carotid pulses. No carotid bruit, hepatojugular reflux or JVD. Trachea: Trachea and phonation normal.   Cardiovascular:      Rate and Rhythm: Normal rate and regular rhythm. Chest Wall: PMI is not displaced. Pulses: Normal pulses. Carotid pulses are 2+ on the right side and 2+ on the left side. Radial pulses are 2+ on the right side and 2+ on the left side. Heart sounds: Normal heart sounds, S1 normal and S2 normal. No murmur heard. Pulmonary:      Effort: Pulmonary effort is normal. No respiratory distress. Breath sounds: Normal breath sounds. No decreased breath sounds, wheezing or rhonchi. Abdominal:      General: Bowel sounds are normal. There is no distension. Palpations: Abdomen is soft. There is no mass. Tenderness: There is no abdominal tenderness. There is no guarding or rebound.       Comments: No HSM   Musculoskeletal:         General: No swelling, tenderness, deformity or signs of injury. Right shoulder: Normal.      Left shoulder: Normal.      Cervical back: Spasms present. No rigidity, tenderness, bony tenderness or crepitus. Pain with movement present. Decreased range of motion. Thoracic back: Normal.      Lumbar back: Normal. No swelling, deformity, signs of trauma or lacerations. Right hip: Normal.      Left hip: Normal.      Right knee: Normal.      Left knee: Normal.      Right lower leg: Normal. No edema. Left lower leg: Normal. No edema. Right ankle: Normal.      Right Achilles Tendon: Normal.      Left ankle: Normal.      Left Achilles Tendon: Normal.   Lymphadenopathy:      Head:      Right side of head: No submental, submandibular, tonsillar, preauricular, posterior auricular or occipital adenopathy. Left side of head: No submental, submandibular, tonsillar, preauricular, posterior auricular or occipital adenopathy. Cervical: No cervical adenopathy. Right cervical: No superficial, deep or posterior cervical adenopathy. Left cervical: No superficial, deep or posterior cervical adenopathy. Skin:     General: Skin is warm. Capillary Refill: Capillary refill takes less than 2 seconds. Nails: There is no clubbing. Neurological:      General: No focal deficit present. Mental Status: She is alert and oriented to person, place, and time. Mental status is at baseline. GCS: GCS eye subscore is 4. GCS verbal subscore is 5. GCS motor subscore is 6. Cranial Nerves: No cranial nerve deficit. Sensory: No sensory deficit. Deep Tendon Reflexes: Reflexes are normal and symmetric. Reflex Scores:       Tricep reflexes are 2+ on the right side and 2+ on the left side. Bicep reflexes are 2+ on the right side and 2+ on the left side. Psychiatric:         Speech: Speech normal.         Behavior: Behavior normal. Behavior is not slowed. Thought Content:  Thought content normal.         Judgment: Judgment normal.            An electronic signature was used to authenticate this note.     --Shanice Greenberg MD

## 2022-07-13 LAB
ANTI-NUCLEAR ANTIBODY (ANA): NEGATIVE
CYCLIC CITRULLINATED PEPTIDE ANTIBODY IGG: <0.5 U/ML (ref 0–2.9)
ESTIMATED AVERAGE GLUCOSE: 105.4 MG/DL
HBA1C MFR BLD: 5.3 %

## 2022-07-22 ENCOUNTER — HOSPITAL ENCOUNTER (OUTPATIENT)
Dept: PHYSICAL THERAPY | Age: 52
Setting detail: THERAPIES SERIES
Discharge: HOME | End: 2022-07-22
Payer: COMMERCIAL

## 2022-07-22 PROCEDURE — 97112 NEUROMUSCULAR REEDUCATION: CPT

## 2022-07-22 PROCEDURE — 97162 PT EVAL MOD COMPLEX 30 MIN: CPT

## 2022-07-22 PROCEDURE — 97530 THERAPEUTIC ACTIVITIES: CPT

## 2022-07-22 NOTE — FLOWSHEET NOTE
[address] [facility] [address] [place] [address]  Phone: [phone]   Fax:     [phone]    Physical Therapy Treatment Note/ Progress Report:     Date:  2022    Patient Name:  Heather Chi    :  1970  MRN: [id number]    Pertinent Medical History:      Medical/Treatment Diagnosis Information:  Diagnosis: M53.3 (ICD-10-CM) - Tail bone pain  M54.2 (ICD-10-CM) - Bilateral posterior neck pain  Treatment Diagnosis: limited mobility, function    Insurance/Certification information:     Physician Information:  Referring Provider (secondary): Elva Archuleta MD  Plan of care signed (Y/N):     Date of Patient follow up with Physician:      Progress Report: []  Yes  [x]  No     Date Range for reporting period:  Beginnin2022  Ending:     Progress report due (10 Rx/or 30 days whichever is less): 0/35/10     Recertification due (POC duration/ or 90 days whichever is less):      Visit # Insurance/POC Allowable Auth Needed    MSK: 60/yr []Yes    []No     Functional Outcomes Measure:    Date Assessed: at eval  Test: FOTO  Score:    Pain level:  /10     HISTORY OF INJURY: SPINE CENTER: Pt here with multiple complaints. She reports neck and tailbone pain. Hx: She fell last September, slipped on steps, fell on tailbone, and also seb her head/ neck. Then around January, she was carrying laundry down steps and slipped again falling on her tailbone and also landed on her sides and seb her neck again. The most recent fall happened this past March, slipped in wet grass and hit her tailbone again and the back of her head on the ground. She had hoped that \"waiting it out\" would make her pains go away in her neck/ tailbone. She bought a neck support/ brace to support her head and it was helpful initially. She tends to wear it at night when her head feels heavy/ tired at the end of the day. She works from home since [employer] ( for [employer]). Sitting desk  Some worsening with long distance vision, close vision is fine. She has gained 30lbs in the past year. Also stopped smoking. Had bad episode of COVID early in pandemic, decr activity lately. Neck:   Posterior/ lateral neck pain with occasional headaches (\"feels like a migraine\"). If she holds her neck, it feels better. Points to L suboccipital region. Ice helps some as well. Occasionally (1x/month) gets sharp/ tingle in her elbow and 4-5th digits. Sleep:  - current: 6-7hrs of restful sleep (new puppy)     *difficulty turning head L to drive (since Sept 2782)  *headaches: they were daily after the last fall in March, but MD has given new meds (diclofenac), but only took a few pills. This week; no HA (no tylenol or OTC meds). *rarely gets lower cervical radic to elbow and 4-5 digits  *neck pain with pressure against face (putting on make-up)  *neck/ head fatigued at end of work day (uses neck brace in evenings for support)     Relevant Medical History:  Hx of L shldr RC partial tear and manipulation. SUBJECTIVE:  See eval    OBJECTIVE:   Observation:   Test measurements:      RESTRICTIONS/PRECAUTIONS:     Exercises/Interventions:   Therapeutic Ex (48009)  Min: Resistance/Repetitions Notes             Periscap strength >         Cervicothoracic stab >         Lumbosacral ex's >future prn         Manual Intervention (21537)  Min:     Cervical manual >OA/AA mobs, man dist, upglides         Lumbosacral manual >future prn                   NMR re-education (99774)  Min: 10'     Cervical isometrics > Seated?    Seated Cerv retract w/OP Pt edu; 5sec x 3    Seated craniocerv flex Teeth into thumbs: 5sec x 3    Seated \"posture practice\" Pt edu                   Therapeutic Activity (73523)  Min: 15'     Pt edu Pathology, role of PT, prognosis, timeframe for improvement, responses to treatments, goals         Modalities  Min: Other Therapeutic Activities:  Pt was educated on PT POC, Diagnosis, Prognosis, pathomechanics as well as frequency and duration of scheduling future physical therapy appointments. Time was also taken on this day to answer all patient questions and participation in PT. Reviewed appointment policy in detail with patient and patient verbalized understanding. Home Exercise Program:    7/22:  Access Code: [email or web address]  URL: [email or web address]  Date: 07/22/2022  Prepared by: Pepe Fuentes     Exercises  Seated Passive Cervical Retraction - 2-3 x daily - 4-6 x weekly - 5 reps - 5-10sec hold  Seated Deep Neck Flexor Nods (incisor pressure into thumbs) - 2-3 x daily - 4-6 x weekly - 2-3 sets - 10-15 reps  Seated Correct Posture - 2-3 x daily - 4-6 x weekly - 2-3 sets - 10-15 reps  *begin light walking program for cardio       Therapeutic Exercise and NMR EXR  [x] (42383) Provided verbal/tactile cueing for activities related to strengthening, flexibility, endurance, ROM  for improvements in cervical, postural, scapular, scapulothoracic and UE control with self care, reaching, carrying, lifting, house/yardwork, driving/computer work. [x] (93249) Provided verbal/tactile cueing for activities related to improving balance, coordination, kinesthetic sense, posture, motor skill, proprioception  to assist with cervical, scapular, scapulothoracic and UE control with self care, reaching, carrying, lifting, house/yardwork, driving/computer work. Therapeutic Activities:    [x] (96515 or 10273) Provided verbal/tactile cueing for activities related to improving balance, coordination, kinesthetic sense, posture, motor skill, proprioception and motor activation to allow for proper function of cervical, scapular, scapulothoracic and UE control with self care, carrying, lifting, driving/computer work.      Home Exercise Program:    [x] (40795) Reviewed/Progressed HEP activities related to strengthening, Progressing: [] Met: [] Not Met: [] Adjusted  2. Patient will have a decrease in pain to facilitate improvement in movement, function, and ADLs as indicated by Functional Deficits. [] Progressing: [] Met: [] Not Met: [] Adjusted    Long Term Goals: To be achieved in: up to 8 weeks  1. Increase FOTO functional outcome score from 56 to 67  to assist with reaching prior level of function. [] Progressing: [] Met: [] Not Met: [] Adjusted  2. Patient will demonstrate increased AROM to [facility] of cervical/thoracic spine to allow for proper joint functioning as indicated by patients Functional Deficits. [] Progressing: [] Met: [] Not Met: [] Adjusted  3. Patient will demonstrate an increase in postural awareness and control and activation of  Deep cervical stabilizers to allow for proper functional mobility as indicated by patients Functional Deficits. [] Progressing: [] Met: [] Not Met: [] Adjusted  4. Patient will return to all functional activities without increased symptoms or restriction. [] Progressing: [] Met: [] Not Met: [] Adjusted    ASSESSMENT:  See eval    Treatment/Activity Tolerance:  [x] Patient tolerated treatment well [] Patient limited by fatique  [] Patient limited by pain  [] Patient limited by other medical complications  [] Other:     Overall Progression Towards Functional goals/ Treatment Progress Update:  [] Patient is progressing as expected towards functional goals listed. [] Progression is slowed due to complexities/Impairments listed. [] Progression has been slowed due to co-morbidities.   [x] Plan just implemented, too soon to assess goals progression <30days   [] Goals require adjustment due to lack of progress  [] Patient is not progressing as expected and requires additional follow up with physician  [] Other    Prognosis for POC: [x] Good [] Fair  [] Poor    Patient requires continued skilled intervention: [x] Yes  [] No        PLAN: See eval  [] Continue per plan of care [] Maribel Hunter current plan (see comments)  [x] Plan of care initiated [] Hold pending MD visit [] Discharge    Electronically signed by: Meri Waggoner PT , DPT, OCS #[id number]      Note: If patient does not return for scheduled/recommended follow up visits, this note will serve as a discharge from care along with the most recent update on progress.

## 2022-07-22 NOTE — PLAN OF CARE
[facility] - Outpatient Rehabilitation & Therapy  [address] [place]  Phone: [phone]   Fax:     [phone]          Physical Therapy Certification    Dear Referring Provider (secondary): Simón Moreno MD,    We had the pleasure of evaluating the following patient for physical therapy services at [facility]. A summary of our findings can be found in the initial assessment below. This includes our plan of care. If you have any questions or concerns regarding these findings, please do not hesitate to contact me at the office phone number checked above. Thank you for the referral.       Physician Signature:_______________________________Date:__________________  By signing above (or electronic signature), therapists plan is approved by physician            Patient: Emily Kenny Day   : 1970   MRN: [id number]  Referring Physician: Referring Provider (secondary): Simón Moreno MD      Evaluation Date: 2022      Medical Diagnosis Information:  Diagnosis: M53.3 (ICD-10-CM) - Tail bone pain  M54.2 (ICD-10-CM) - Bilateral posterior neck pain   Treatment Diagnosis: limited mobility, function                                         Insurance information:      Precautions/ Contra-indications:   Latex Allergy:  [x]NO      []YES  Preferred Language for Healthcare:   [x]English       []other:    C-SSRS Triggered by Intake questionnaire (Past 2 wk assessment ):   [x] No, Questionnaire did not trigger screening.   [] Yes, Patient intake triggered C-SSRS Screening      [] C-SSRS Screening completed  [] PCP notified via Epic     SUBJECTIVE: Patient stated complaint: Pt here with multiple complaints. She reports neck and tailbone pain. Hx: She fell last September, slipped on steps, fell on tailbone, and also seb her head/ neck.  Then around January, she was carrying laundry down steps and slipped again falling on her tailbone and also landed on her sides and seb her neck again. The most recent fall happened this past March, slipped in wet grass and hit her tailbone again and the back of her head on the ground. She had hoped that \"waiting it out\" would make her pains go away in her neck/ tailbone. She bought a neck support/ brace to support her head and it was helpful initially. She tends to wear it at night when her head feels heavy/ tired at the end of the day. She works from home since [employer] ( for [employer]). Sitting desk  Some worsening with long distance vision, close vision is fine. She has gained 30lbs in the past year. Also stopped smoking. Had bad episode of COVID early in pandemic, decr activity lately. Neck:    Posterior/ lateral neck pain with occasional headaches (\"feels like a migraine\"). If she holds her neck, it feels better. Points to L suboccipital region. Ice helps some as well. Occasionally (1x/month) gets sharp/ tingle in her elbow and 4-5th digits. Sleep:   - current: 6-7hrs of restful sleep (new puppy)    *difficulty turning head L to drive (since Sept 7160)  *headaches: they were daily after the last fall in March, but MD has given new meds (diclofenac), but only took a few pills. This week; no HA (no tylenol or OTC meds). *rarely gets lower cervical radic to elbow and 4-5 digits  *neck pain with pressure against face (putting on make-up)  *neck/ head fatigued at end of work day (uses neck brace in evenings for support)    Relevant Medical History:  Hx of L shldr RC partial tear and manipulation.    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     Living Status/Prior Level of Function: Independent with ADLs and IADLs,     OBJECTIVE:   Posture: mild forward head    Functional Mobility/Transfers:     Palpation: tender L cervical facets; mostly mid and lower levels, tender L suboccip, no tenderness midline    Bandages/Dressings/Incisions: NA    Gait: (include devices/WB status):  WNL     CERV ROM     Cervical Flexion     Cervical Extension      Left Right   Cervical SB     Cervical rotation 45* 75*   Quadrant     Dorsal Glide      UE ROM Left Right   Shoulder Flex     Shoulder Abd     Shoulder ER     Shoulder IR     Elbow flex/ext     Wrist flex/ext/pro/sup     Finger flex/ext/opposition     Shoulder AROM WNL w OP WNL WNL   UE Strength  Left Right   Shoulder Flex     Shoulder Scap     Shoulder ABd (C5 Axillary)     Shoulder ER      Shoulder IR     Elbow Flex (C5 Musc)     Elbow Ext (C7 Radial)     Wrist Flex (C6 Radial)     Wrist Ext (C7 Radial)     Finger flex (C8 median)     Finger ext (C7 Radial-PIN)     APB (T1 Median)     Finger Abd (T1 Ulnar)     UE myotomes WNL x x      Reflexes Normal Abnormal Comments               S1-2 Seated achilles [] []    S1-2 Prone knee bend [] []    L3-4 Patellar tendon [] []    C5-6 Biceps [] []    C6 Brachioradialis [] []    C7-8 Triceps [] []      Reflexes/Sensation:    []Dermatomes/Myotomes intact    []Reflexes equal and normal bilaterally   []Other:    Joint mobility:    []Normal    [x]Hypo; at least mid/ lower cervical w/ L rotation   []Hyper    Neurodynamics:     Orthopedic Special Tests:      Cluster Testing  Normal Abnormal N/A Comments   Babinski Test [] [] []    Palma Test [x] [] []    Inverted Sup Sign [] [] []    Alar Ligament Test [x] [] []    Transverse Ligament Test [x] [] []    Sharp-Cesia Test [x] [] []    Hautards Test [] [] []    Vertebral Artery Test [] [] []             Neural dynamic/ Tension testing Normal Abnormal N/A Comments   Spurling Maneuver:  [] [] []    Distraction testing: [] [] [] \"Feels good\"   ULNT [] [] [] Shoulder Abd testing  [] [] []    Cerv Rot/Lat Flex- 1st Rib [] [] []    Deep Neck Flex/endurance testing [] [] [] Tuck w/ lift>20sec minimal shaking   Craniocerv Flex testing [place] [] [] []    End Range Tolerance testing. [] [] []     [] [] []                           [x] Patient history, allergies, meds reviewed. Medical chart reviewed. See intake form. Review Of Systems (ROS):  [x]Performed Review of systems (Integumentary, CardioPulmonary, Neurological) by intake and observation. Intake form has been scanned into medical record. Patient has been instructed to contact their primary care physician regarding ROS issues if not already being addressed at this time. Co-morbidities/Complexities (which will affect course of rehabilitation):  has a past medical history of Broken tooth, Capsulitis of shoulder, left, Dental bridge present, Dental crowns present, Migraine without status migrainosus, not intractable, and Sleep apnea.     []None           Arthritic conditions   []Rheumatoid arthritis (M05.9)  []Osteoarthritis (M19.91)   Cardiovascular conditions   []Hypertension (I10)  []Hyperlipidemia (E78.5)  []Angina pectoris (I20)  []Atherosclerosis (I70)  []CVA Musculoskeletal conditions   []Disc pathology   []Congenital spine pathologies   []Prior surgical intervention  []Osteoporosis (M81.8)  []Osteopenia (M85.8)   Endocrine conditions   []Hypothyroid (E03.9)  []Hyperthyroid Gastrointestinal conditions   []Constipation (U01.89)   Metabolic conditions   []Morbid obesity (E66.01)  []Diabetes type 1(E10.65) or 2 (E11.65)   []Neuropathy (G60.9)     Pulmonary conditions   []Asthma (J45)  []Coughing   []COPD (J44.9)   Psychological Disorders  []Anxiety (F41.9)  []Depression (F32.9)   []Other:   [x]Other:     See above     Barriers to/and or personal factors that will affect rehab potential:              []Age  []Sex   []Smoker              []Motivation/Lack of Motivation                        [x]Co-Morbidities []Cognitive Function, education/learning barriers              [x]Environmental, home barriers              [x]profession/work barriers  [x]past PT/medical experience  []other:  Justification:     Falls Risk Assessment (30 days):   [x] Falls Risk assessed and no intervention required. [] Falls Risk assessed and Patient requires intervention due to being higher risk   TUG score (>12s at risk):     [] Falls education provided, including     ASSESSMENT:    Functional Impairments:     [x]Noted cervical/thoracic/GHJ joint hypomobility   []Noted cervical/thoracic/GHJ joint hypermobility   []Decreased cervical/UE functional ROM   [x]Noted Headache pain aggravated by neck movements with/without dizziness   []Abnormal reflexes/sensation/myotomal/dermatomal deficits   [x]Decreased DCF control or ability to hold head up   [x]Decreased RC/scapular/core strength and neuromuscular control    []Decreased UE functional strength   []other:      Functional Activity Limitations (from functional questionnaire and intake)   [x]Reduced ability to tolerate prolonged functional positions   [x]Reduced ability or difficulty with changes of positions or transfers between positions   [x]Reduced ability to maintain good posture and demonstrate good body mechanics with sitting, bending, and lifting   [x] Reduced ability or tolerance with driving and/or computer work   [x]Reduced ability to perform lifting, reaching, carrying tasks   []Reduced ability to concentrate   []Reduced ability to sleep    [x]Reduced ability to tolerate any impact through UE or spine   []Reduced ability to ambulate prolonged functional periods/distances   []other:    Participation Restrictions   [x]Reduced participation in self care activities   [x]Reduced participation in home management activities   [x]Reduced participation in work activities   [x]Reduced participation in social activities.    []Reduced participation in sport/recreational activities. Classification/Subgrouping:   [x]signs/symptoms consistent with neck pain with mobility deficits     [x]signs/symptoms consistent with neck pain with movement coordinated impairments    [x]signs/symptoms consistent with neck pain with radiating pain    [x]signs/symptoms consistent with neck pain with headaches (cervicogenic)    []Signs/symptoms consistent with nerve root involvement including myotome & dermatome dysfunction   [x]sign/symptoms consistent with facet dysfunction of cervical and thoracic spine    []signs/symptoms consistent suggesting central cord compression/UMN syndromes   []signs/symptoms consistent with discogenic cervical pain   []signs/symptoms consistent with rib dysfunction   [x]signs/symptoms consistent with postural dysfunction   []signs/symptoms consistent with shoulder pathology    []signs/symptoms consistent with post-surgical status including decreased ROM, strength and function.    [x]signs/symptoms consistent with pathology which may benefit from Dry Needling   []signs/symptoms which may limit the use of advanced manual therapy techniques: (Elevated CV risk profile, recent trauma, intolerance to end range positions, prior TIA, visual issues, UE neurological compromise )     Prognosis/Rehab Potential:      [x]Excellent   []Good    []Fair   []Poor    Tolerance of evaluation/treatment:    [x]Excellent   []Good    []Fair   []Poor    Physical Therapy Evaluation Complexity Justification  [x] A history of present problem with:  [] no personal factors and/or comorbidities that impact the plan of care;  [x]1-2 personal factors and/or comorbidities that impact the plan of care  []3 personal factors and/or comorbidities that impact the plan of care  [x] An examination of body systems using standardized tests and measures addressing any of the following: body structures and functions (impairments), activity limitations, and/or participation restrictions;:  [] a total of 1-2 or more elements   [x] a total of 3 or more elements   [] a total of 4 or more elements   [x] A clinical presentation with:  [] stable and/or uncomplicated characteristics   [x] evolving clinical presentation with changing characteristics  [] unstable and unpredictable characteristics;   [x] Clinical decision making of [] low, [] moderate, [] high complexity using standardized patient assessment instrument and/or measurable assessment of functional outcome. [] EVAL (LOW) 08839 (typically 20 minutes face-to-face)  [x] EVAL (MOD) 53376 (typically 30 minutes face-to-face)  [] EVAL (HIGH) 19100 (typically 45 minutes face-to-face)  [] RE-EVAL     PLAN:   Frequency/Duration:  up to 2 days per week for up to 6-8 Weeks. Will contact referring provider by 5th visit to update on progress per spine center program.   Interventions:  [x]  Therapeutic exercise including: strength training, ROM, for cervical spine,scapula, core and Upper extremity, including postural re-education. [x]  NMR activation and proprioception for Deep cervical flexors, periscapular and RC muscles and Core, including postural re-education. [x]  Manual therapy as indicated for C/T spine, ribs, Soft tissue to include: Dry Needling/IASTM, STM, PROM, Gr I-IV mobilizations, manipulation. [x] Modalities as needed that may include: thermal agents, E-stim, Biofeedback, US, iontophoresis as indicated  [x] Patient education on joint protection, postural re-education, activity modification, progression of HEP. HEP instruction:  Access Code: [email or web address]  URL: [email or web address]  Date: 07/22/2022  Prepared by: Marylen Lindau    Exercises  Seated Passive Cervical Retraction - 2-3 x daily - 4-6 x weekly - 5 reps - 5-10sec hold  Seated Deep Neck Flexor Nods (incisor pressure into thumbs) - 2-3 x daily - 4-6 x weekly - 2-3 sets - 10-15 reps  Seated Correct Posture - 2-3 x daily - 4-6 x weekly - 2-3 sets - 10-15 reps      GOALS:  Patient stated goal: able to work full day w/o HA or neck pain. [] Progressing: [] Met: [] Not Met: [] Adjusted    Therapist goals for Patient:   Short Term Goals: To be achieved in: 2 weeks  1. Independent in HEP and progression per patient tolerance, in order to prevent re-injury. [] Progressing: [] Met: [] Not Met: [] Adjusted  2. Patient will have a decrease in pain to facilitate improvement in movement, function, and ADLs as indicated by Functional Deficits. [] Progressing: [] Met: [] Not Met: [] Adjusted    Long Term Goals: To be achieved in: up to 8 weeks  1. Increase FOTO functional outcome score from 56 to 67  to assist with reaching prior level of function. [] Progressing: [] Met: [] Not Met: [] Adjusted  2. Patient will demonstrate increased AROM to [facility] of cervical/thoracic spine to allow for proper joint functioning as indicated by patients Functional Deficits. [] Progressing: [] Met: [] Not Met: [] Adjusted  3. Patient will demonstrate an increase in postural awareness and control and activation of  Deep cervical stabilizers to allow for proper functional mobility as indicated by patients Functional Deficits. [] Progressing: [] Met: [] Not Met: [] Adjusted  4. Patient will return to all functional activities without increased symptoms or restriction. [] Progressing: [] Met: [] Not Met: [] Adjusted          Electronically signed by: Jorge Rhodes PT  , DPT, OCS #[id number]          Note: If patient does not return for scheduled/recommended follow up visits, this note will serve as a discharge from care along with the most recent update on progress.

## 2022-08-01 ENCOUNTER — APPOINTMENT (OUTPATIENT)
Dept: PHYSICAL THERAPY | Age: 52
End: 2022-08-01
Payer: COMMERCIAL

## 2022-08-02 ENCOUNTER — HOSPITAL ENCOUNTER (OUTPATIENT)
Dept: PHYSICAL THERAPY | Age: 52
Setting detail: THERAPIES SERIES
Discharge: HOME | End: 2022-08-02
Payer: COMMERCIAL

## 2022-08-02 PROCEDURE — 97112 NEUROMUSCULAR REEDUCATION: CPT

## 2022-08-02 PROCEDURE — 97530 THERAPEUTIC ACTIVITIES: CPT

## 2022-08-02 PROCEDURE — 97140 MANUAL THERAPY 1/> REGIONS: CPT

## 2022-08-02 NOTE — FLOWSHEET NOTE
[address] [facility]. [address]  Phone: [phone]   Fax:     [phone]    Physical Therapy Treatment Note/ Progress Report:     Date:  2022    Patient Name:  Blaze Chi    :  1970  MRN: [id number]    Pertinent Medical History:      Medical/Treatment Diagnosis Information:  Diagnosis: M53.3 (ICD-10-CM) - Tail bone pain  M54.2 (ICD-10-CM) - Bilateral posterior neck pain  Treatment Diagnosis: limited mobility, function    Insurance/Certification information:     Physician Information:  Referring Provider (secondary): Arnoldo Ramsay MD  Plan of care signed (Y/N):     Date of Patient follow up with Physician:      Progress Report: []  Yes  [x]  No     Date Range for reporting period:  Beginnin2022  Ending:     Progress report due (10 Rx/or 30 days whichever is less):      Recertification due (POC duration/ or 90 days whichever is less):      Visit # Insurance/POC Allowable Auth Needed   2/ MSK: 60/yr []Yes    []No     Functional Outcomes Measure:    Date Assessed: at eval  Test: FOTO  Score:    Pain level:  /10     HISTORY OF INJURY: SPINE CENTER: Pt here with multiple complaints. She reports neck and tailbone pain. Hx: She fell last September, slipped on steps, fell on tailbone, and also seb her head/ neck. Then around January, she was carrying laundry down steps and slipped again falling on her tailbone and also landed on her sides and seb her neck again. The most recent fall happened this past March, slipped in wet grass and hit her tailbone again and the back of her head on the ground. She had hoped that \"waiting it out\" would make her pains go away in her neck/ tailbone. She bought a neck support/ brace to support her head and it was helpful initially. She tends to wear it at night when her head feels heavy/ tired at the end of the day. and NMR EXR  [x] (68117) Provided verbal/tactile cueing for activities related to strengthening, flexibility, endurance, ROM  for improvements in cervical, postural, scapular, scapulothoracic and UE control with self care, reaching, carrying, lifting, house/yardwork, driving/computer work. [x] (53460) Provided verbal/tactile cueing for activities related to improving balance, coordination, kinesthetic sense, posture, motor skill, proprioception  to assist with cervical, scapular, scapulothoracic and UE control with self care, reaching, carrying, lifting, house/yardwork, driving/computer work. Therapeutic Activities:    [x] (43263 or 73495) Provided verbal/tactile cueing for activities related to improving balance, coordination, kinesthetic sense, posture, motor skill, proprioception and motor activation to allow for proper function of cervical, scapular, scapulothoracic and UE control with self care, carrying, lifting, driving/computer work.      Home Exercise Program:    [x] (21860) Reviewed/Progressed HEP activities related to strengthening, flexibility, endurance, ROM of cervical, scapular, scapulothoracic and UE control with self care, reaching, carrying, lifting, house/yardwork, driving/computer work  [x] (79364) Reviewed/Progressed HEP activities related to improving balance, coordination, kinesthetic sense, posture, motor skill, proprioception of cervical, scapular, scapulothoracic and UE control with self care, reaching, carrying, lifting, house/yardwork, driving/computer work      Manual Treatments:  PROM / STM / Oscillations-Mobs:  G-I, II, III, IV (PA's, Inf., Post.)  [x] (25915) Provided manual therapy to mobilize soft tissue/joints of cervical/CT, scapular GHJ and UE for the purpose of decreasing headache, modulating pain, promoting relaxation,  increasing ROM, reducing/eliminating soft tissue swelling/inflammation/restriction, improving soft tissue extensibility and allowing for proper ROM for normal function with self care, reaching, carrying, lifting, house/yardwork, driving/computer work      Approval Dates:  CPT Code Units Approved Units Used  Date Updated:                     Charges:  Timed Code Treatment Minutes: 39   Total Treatment Minutes: 39     [] EVAL (LOW) 90987 (typically 20 minutes face-to-face)  [] EVAL (MOD) 40421 (typically 30 minutes face-to-face)  [] EVAL (HIGH) 90633 (typically 45 minutes face-to-face)  [] RE-EVAL     [] UW(64548) x     [] Dry needle 1 or 2 Muscles (06994)  [x] NMR (95060) x     [] Dry needle 3+ Muscles (75541)  [x] Manual (10776) x     [] Ultrasound (34898) x  [x] TA (39309) x     [] Mech Traction (71808)  [] ES(attended) (61387)     [] ES (un) (67403):   [] Vasopump (20658) [] Ionto (28303)   [] Other:    GOALS:  Patient stated goal: able to work full day w/o HA or neck pain. [] Progressing: [] Met: [] Not Met: [] Adjusted    Therapist goals for Patient:   Short Term Goals: To be achieved in: 2 weeks  1. Independent in HEP and progression per patient tolerance, in order to prevent re-injury. [] Progressing: [] Met: [] Not Met: [] Adjusted  2. Patient will have a decrease in pain to facilitate improvement in movement, function, and ADLs as indicated by Functional Deficits. [] Progressing: [] Met: [] Not Met: [] Adjusted    Long Term Goals: To be achieved in: up to 8 weeks  1. Increase FOTO functional outcome score from 56 to 67  to assist with reaching prior level of function. [] Progressing: [] Met: [] Not Met: [] Adjusted  2. Patient will demonstrate increased AROM to [facility] of cervical/thoracic spine to allow for proper joint functioning as indicated by patients Functional Deficits. [] Progressing: [] Met: [] Not Met: [] Adjusted  3. Patient will demonstrate an increase in postural awareness and control and activation of  Deep cervical stabilizers to allow for proper functional mobility as indicated by patients Functional Deficits.    [] Progressing: [] Met: [] Not Met: [] Adjusted  4. Patient will return to all functional activities without increased symptoms or restriction. [] Progressing: [] Met: [] Not Met: [] Adjusted    ASSESSMENT:  Ash session well. HA partially centralized and had incr L cerv rot ROM. She liked the new ex's today. Lots of education about avoiding over massaging L posterior head and also take 1-3 breaks during the day to break pain cycle. Treatment/Activity Tolerance:  [x] Patient tolerated treatment well [] Patient limited by fatique  [] Patient limited by pain  [] Patient limited by other medical complications  [] Other:     Overall Progression Towards Functional goals/ Treatment Progress Update:  [] Patient is progressing as expected towards functional goals listed. [] Progression is slowed due to complexities/Impairments listed. [] Progression has been slowed due to co-morbidities. [x] Plan just implemented, too soon to assess goals progression <30days   [] Goals require adjustment due to lack of progress  [] Patient is not progressing as expected and requires additional follow up with physician  [] Other    Prognosis for POC: [x] Good [] Fair  [] Poor    Patient requires continued skilled intervention: [x] Yes  [] No        PLAN: See eval  [] Continue per plan of care [] Alter current plan (see comments)  [x] Plan of care initiated [] Hold pending MD visit [] Discharge    Electronically signed by: Meri Waggoner, PT , DPT, OCS #[id number]      Note: If patient does not return for scheduled/recommended follow up visits, this note will serve as a discharge from care along with the most recent update on progress.

## 2022-08-09 ENCOUNTER — HOSPITAL ENCOUNTER (OUTPATIENT)
Dept: PHYSICAL THERAPY | Age: 52
Setting detail: THERAPIES SERIES
Discharge: HOME | End: 2022-08-09
Payer: COMMERCIAL

## 2022-08-09 PROCEDURE — 97140 MANUAL THERAPY 1/> REGIONS: CPT

## 2022-08-09 PROCEDURE — 97112 NEUROMUSCULAR REEDUCATION: CPT

## 2022-08-09 NOTE — FLOWSHEET NOTE
[address] [facility]. [address]  Phone: [phone]   Fax:     [phone]    Physical Therapy Treatment Note/ Progress Report:     Date:  2022    Patient Name:  Albin Chi    :  1970  MRN: [id number]    Pertinent Medical History:      Medical/Treatment Diagnosis Information:  Diagnosis: M53.3 (ICD-10-CM) - Tail bone pain  M54.2 (ICD-10-CM) - Bilateral posterior neck pain  Treatment Diagnosis: limited mobility, function    Insurance/Certification information:     Physician Information:  Referring Provider (secondary): Chencho Anthony MD  Plan of care signed (Y/N):     Date of Patient follow up with Physician:      Progress Report: []  Yes  [x]  No     Date Range for reporting period:  Beginnin2022  Ending:     Progress report due (10 Rx/or 30 days whichever is less):      Recertification due (POC duration/ or 90 days whichever is less):      Visit # Insurance/POC Allowable Auth Needed   3/5 MSK: 60/yr []Yes    []No     Functional Outcomes Measure:    Date Assessed: at eval  Test: FOTO  Score:    Pain level:  /10     HISTORY OF INJURY: SPINE CENTER: Pt here with multiple complaints. She reports neck and tailbone pain. Hx: She fell last September, slipped on steps, fell on tailbone, and also seb her head/ neck. Then around January, she was carrying laundry down steps and slipped again falling on her tailbone and also landed on her sides and seb her neck again. The most recent fall happened this past March, slipped in wet grass and hit her tailbone again and the back of her head on the ground. She had hoped that \"waiting it out\" would make her pains go away in her neck/ tailbone. She bought a neck support/ brace to support her head and it was helpful initially. She tends to wear it at night when her head feels heavy/ tired at the end of the day. She works from home since [employer] ( for [employer]). Sitting desk  Some worsening with long distance vision, close vision is fine. She has gained 30lbs in the past year. Also stopped smoking. Had bad episode of COVID early in pandemic, decr activity lately. Neck:   Posterior/ lateral neck pain with occasional headaches (\"feels like a migraine\"). If she holds her neck, it feels better. Points to L suboccipital region. Ice helps some as well. Occasionally (1x/month) gets sharp/ tingle in her elbow and 4-5th digits. Sleep:  - current: 6-7hrs of restful sleep (new puppy)     *difficulty turning head L to drive (since Sept 4175)  *headaches: they were daily after the last fall in March, but MD has given new meds (diclofenac), but only took a few pills. This week; no HA (no tylenol or OTC meds). *rarely gets lower cervical radic to elbow and 4-5 digits  *neck pain with pressure against face (putting on make-up)  *neck/ head fatigued at end of work day (uses neck brace in evenings for support)     Relevant Medical History:  Hx of L shldr RC partial tear and manipulation. SUBJECTIVE:    8/2: neck was sore after first visit; L side neck. Feels her head is turning better to the left. Headaches have returned nearly every other day; L sided HA from suboccip to superior head - worse end of day. 8/9: pt reports cont'd improvement. No HA in the past week or so.      OBJECTIVE:   Observation:   Test measurements:    ROM 7/22 8/2/2022 8/9      Cerv rot  L=45*  R=75* @ start: L=53*  @ end:   L=63* @ start:   L: 55*  @end   75*                                            Strength                                                      TESTS/ OTHER         C2 mobility  WFL, no P       Red flags  Denies visual/ speech/ numbness                                        RESTRICTIONS/PRECAUTIONS:     Exercises/Interventions:   Therapeutic Ex (76536)  Min: 3' Resistance/Repetitions Notes Cervicothoracic stab >         Lumbosacral ex's >future prn    Pt edu Updated HEP; thorough review; see below    Manual Intervention (52955)  Min: 18'     Cervical manual Man dist, side glides - mid grade, SO STM, upglides- mid grade/ mini thrusts    STM SO iastm x 5'    Lumbosacral/ coccyx manual >assess future prn                   NMR re-education (12050)  Min: 15'     Cervical isometrics Seated? Seated Cerv retract w/OP    Seated craniocerv flex    Seated \"posture practice\"    Self cervical traction         Cerv rot wall ball 2x8 Cues for posture/ form   Wall angels Cues for posture/ form        Seated TB HAB Grn 2x10 W/ cerv retraction   Seated scaption  2# bilat 2x10 W/ cerv retraction        Therapeutic Activity (97155)  Min: 5'     Pt edu Pathology, need for cont'd restoration of mobility and cervical stability         Modalities  Min:                  Other Therapeutic Activities:  Pt was educated on PT POC, Diagnosis, Prognosis, pathomechanics as well as frequency and duration of scheduling future physical therapy appointments. Time was also taken on this day to answer all patient questions and participation in PT. Reviewed appointment policy in detail with patient and patient verbalized understanding. Home Exercise Program:    7/22:  Access Code: [email or web address]  URL: [email or web address]  Date: 07/22/2022  Prepared by: Salvatore York     Exercises  Seated Passive Cervical Retraction - 2-3 x daily - 4-6 x weekly - 5 reps - 5-10sec hold  Seated Deep Neck Flexor Nods (incisor pressure into thumbs) - 2-3 x daily - 4-6 x weekly - 2-3 sets - 10-15 reps  Seated Correct Posture - 2-3 x daily - 4-6 x weekly - 2-3 sets - 10-15 reps  *begin light walking program for cardio    8/2: Access Code: [email or web address]  URL: [email or web address]  Date: 08/02/2022  Prepared by: Salvatore York    Exercises  Supine Cervical Retraction with Towel - 2-3 x daily - 4-6 x weekly - 2-3 sets - 10-15 reps  Isometric Cervical Flexion at Wall with Ball - 2-3 x daily - 4-6 x weekly - 2-3 sets - 10-15 reps  Wall Britt - 2-3 x daily - 4-6 x weekly - 2-3 sets - 10-15 reps      8/9: seated HAB pull apart, seated scaption       Therapeutic Exercise and NMR EXR  [x] (82367) Provided verbal/tactile cueing for activities related to strengthening, flexibility, endurance, ROM  for improvements in cervical, postural, scapular, scapulothoracic and UE control with self care, reaching, carrying, lifting, house/yardwork, driving/computer work. [x] (55926) Provided verbal/tactile cueing for activities related to improving balance, coordination, kinesthetic sense, posture, motor skill, proprioception  to assist with cervical, scapular, scapulothoracic and UE control with self care, reaching, carrying, lifting, house/yardwork, driving/computer work. Therapeutic Activities:    [x] (17172 or 57573) Provided verbal/tactile cueing for activities related to improving balance, coordination, kinesthetic sense, posture, motor skill, proprioception and motor activation to allow for proper function of cervical, scapular, scapulothoracic and UE control with self care, carrying, lifting, driving/computer work.      Home Exercise Program:    [x] (16223) Reviewed/Progressed HEP activities related to strengthening, flexibility, endurance, ROM of cervical, scapular, scapulothoracic and UE control with self care, reaching, carrying, lifting, house/yardwork, driving/computer work  [x] (08919) Reviewed/Progressed HEP activities related to improving balance, coordination, kinesthetic sense, posture, motor skill, proprioception of cervical, scapular, scapulothoracic and UE control with self care, reaching, carrying, lifting, house/yardwork, driving/computer work      Manual Treatments:  PROM / STM / Oscillations-Mobs:  G-I, II, III, IV (PA's, Inf., Post.)  [x] (59699) Provided manual therapy to mobilize soft tissue/joints of cervical/CT, scapular GHJ and UE for the purpose of decreasing headache, modulating pain, promoting relaxation,  increasing ROM, reducing/eliminating soft tissue swelling/inflammation/restriction, improving soft tissue extensibility and allowing for proper ROM for normal function with self care, reaching, carrying, lifting, house/yardwork, driving/computer work      Approval Dates:  CPT Code Units Approved Units Used  Date Updated:                     Charges:  Timed Code Treatment Minutes: 41   Total Treatment Minutes: 41     [] EVAL (LOW) 93924 (typically 20 minutes face-to-face)  [] EVAL (MOD) 49026 (typically 30 minutes face-to-face)  [] EVAL (HIGH) 85758 (typically 45 minutes face-to-face)  [] RE-EVAL     [] NV(49750) x     [] Dry needle 1 or 2 Muscles (11828)  [x] NMR (41913) x     [] Dry needle 3+ Muscles (23858)  [x] Manual (03949) x  2   [] Ultrasound (42629) x  [] TA (46291) x     [] Mech Traction (97160)  [] ES(attended) (91394)     [] ES (un) (95266):   [] Vasopump (79621) [] Ionto (64725)   [] Other:    GOALS:  Patient stated goal: able to work full day w/o HA or neck pain. [] Progressing: [] Met: [] Not Met: [] Adjusted    Therapist goals for Patient:   Short Term Goals: To be achieved in: 2 weeks  1. Independent in HEP and progression per patient tolerance, in order to prevent re-injury. [] Progressing: [] Met: [] Not Met: [] Adjusted  2. Patient will have a decrease in pain to facilitate improvement in movement, function, and ADLs as indicated by Functional Deficits. [] Progressing: [] Met: [] Not Met: [] Adjusted    Long Term Goals: To be achieved in: up to 8 weeks  1. Increase FOTO functional outcome score from 56 to 67  to assist with reaching prior level of function. [] Progressing: [] Met: [] Not Met: [] Adjusted  2. Patient will demonstrate increased AROM to [facility] of cervical/thoracic spine to allow for proper joint functioning as indicated by patients Functional Deficits.   [] Progressing: [] Met: [] Not Met: [] Adjusted  3. Patient will demonstrate an increase in postural awareness and control and activation of  Deep cervical stabilizers to allow for proper functional mobility as indicated by patients Functional Deficits. [] Progressing: [] Met: [] Not Met: [] Adjusted  4. Patient will return to all functional activities without increased symptoms or restriction. [] Progressing: [] Met: [] Not Met: [] Adjusted    ASSESSMENT:  Pt con'ts to respond quite well. Cerv rot ROM near symmetrical at end of session. HA's are much better as well. Pt able to self correct during seated periscap ex's and frequently found herself falling into fwd head posture. Treatment/Activity Tolerance:  [x] Patient tolerated treatment well [] Patient limited by fatique  [] Patient limited by pain  [] Patient limited by other medical complications  [] Other:     Overall Progression Towards Functional goals/ Treatment Progress Update:  [] Patient is progressing as expected towards functional goals listed. [] Progression is slowed due to complexities/Impairments listed. [] Progression has been slowed due to co-morbidities. [x] Plan just implemented, too soon to assess goals progression <30days   [] Goals require adjustment due to lack of progress  [] Patient is not progressing as expected and requires additional follow up with physician  [] Other    Prognosis for POC: [x] Good [] Fair  [] Poor    Patient requires continued skilled intervention: [x] Yes  [] No        PLAN: See eval  [] Continue per plan of care [] Alter current plan (see comments)  [x] Plan of care initiated [] Hold pending MD visit [] Discharge    Electronically signed by: Naya Griffin, PT , DPT, OCS #[id number]      Note: If patient does not return for scheduled/recommended follow up visits, this note will serve as a discharge from care along with the most recent update on progress.

## 2022-08-10 NOTE — PLAN OF CARE
[address] [facility] [place]. [address]  Phone: [phone]   Fax:     [phone]    Physical Therapy Prescription    Date: 8/10/2022    Patient Name: Lidia Chi  : 1970  MRN: [id number]    Diagnosis:   Diagnosis: M53.3 (ICD-10-CM) - Tail bone pain  M54.2 (ICD-10-CM) - Bilateral posterior neck pain  Treatment Diagnosis: limited mobility, function    Referring Physician:  Dr. Alicja Osborne MD    Drug Allergies:    With your approval we would like to add the following to the patient's current PT treatment:    [x] DRY NEEDLING    [] TENS Unit        [] [facility] Cervical Traction Unit        [] [facility] Lumbar Traction Unit    [] Telma Clements        [] Rolling/Standard Theador Corpus         [] Iontophoresis: Dexamethasone 4mg/ml injectable-30 ml vial     Quantity: 1 vial for iontophoresis     As needed        Electronically signed by: Everette Payne PT , DPT, OCS #[id number]      If you have any questions or concerns, please don't hesitate to call.   Thank you for your referral.    Physician Signature:________________________________Date:__________________  By signing above, therapists plan is approved by physician

## 2022-08-16 ENCOUNTER — APPOINTMENT (OUTPATIENT)
Dept: PHYSICAL THERAPY | Age: 52
End: 2022-08-16
Payer: COMMERCIAL

## 2022-08-17 ENCOUNTER — HOSPITAL ENCOUNTER (OUTPATIENT)
Dept: PHYSICAL THERAPY | Age: 52
Setting detail: THERAPIES SERIES
Discharge: HOME | End: 2022-08-17
Payer: COMMERCIAL

## 2022-08-17 PROCEDURE — 97112 NEUROMUSCULAR REEDUCATION: CPT

## 2022-08-17 PROCEDURE — 97140 MANUAL THERAPY 1/> REGIONS: CPT

## 2022-08-17 NOTE — FLOWSHEET NOTE
[address] [facility] [place]. [address]  Phone: [phone]   Fax:     [phone]    Physical Therapy Treatment Note/ Progress Report:     Date:  2022    Patient Name:  Diana Chi    :  1970  MRN: [id number]    Pertinent Medical History:      Medical/Treatment Diagnosis Information:  Diagnosis: M53.3 (ICD-10-CM) - Tail bone pain  M54.2 (ICD-10-CM) - Bilateral posterior neck pain  Treatment Diagnosis: limited mobility, function    Insurance/Certification information:     Physician Information:  Referring Provider (secondary): Stefany Pascual MD  Plan of care signed (Y/N):     Date of Patient follow up with Physician:      Progress Report: []  Yes  [x]  No     Date Range for reporting period:  Beginnin2022  Ending:     Progress report due (10 Rx/or 30 days whichever is less):      Recertification due (POC duration/ or 90 days whichever is less):      Visit # Insurance/POC Allowable Auth Needed   / MSK: 60/yr []Yes    []No     Functional Outcomes Measure:    Date Assessed: at eval  Test: FOTO  Score:    Pain level:  /10     HISTORY OF INJURY: SPINE CENTER: Pt here with multiple complaints. She reports neck and tailbone pain. Hx: She fell last September, slipped on steps, fell on tailbone, and also seb her head/ neck. Then around January, she was carrying laundry down steps and slipped again falling on her tailbone and also landed on her sides and seb her neck again. The most recent fall happened this past March, slipped in wet grass and hit her tailbone again and the back of her head on the ground. She had hoped that \"waiting it out\" would make her pains go away in her neck/ tailbone. She bought a neck support/ brace to support her head and it was helpful initially. She tends to wear it at night when her head feels heavy/ tired at the end of the day. She works from home since [employer] ( for [employer]). Sitting desk  Some worsening with long distance vision, close vision is fine. She has gained 30lbs in the past year. Also stopped smoking. Had bad episode of COVID early in pandemic, decr activity lately. Neck:   Posterior/ lateral neck pain with occasional headaches (\"feels like a migraine\"). If she holds her neck, it feels better. Points to L suboccipital region. Ice helps some as well. Occasionally (1x/month) gets sharp/ tingle in her elbow and 4-5th digits. Sleep:  - current: 6-7hrs of restful sleep (new puppy)     *difficulty turning head L to drive (since Sept 8786)  *headaches: they were daily after the last fall in March, but MD has given new meds (diclofenac), but only took a few pills. This week; no HA (no tylenol or OTC meds). 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 Relevant Medical History:  Hx of L shldr RC partial tear and manipulation. SUBJECTIVE:    8/2: neck was sore after first visit; L side neck. Feels her head is turning better to the left. Headaches have returned nearly every other day; L sided HA from suboccip to superior head - worse end of day. 8/9: pt reports cont'd improvement. No HA in the past week or so.   8/16: cont'd improvement overall. Avery has been nearly pain free for a week+. No HA in quite a while. She reports approx 60-70% improvement overall. Neck fatigues end of day, but hasn't used her neck brace for a while. 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  Biggest c/o is some L sided neck stiffness/ restriction with neck flexion and rotation. Able to turn head better driving.      **NV FOTO: may consider DN if appropriate    OBJECTIVE:   Observation:   Test measurements:    ROM 7/22 8/2/2022 8/9 8/17     Cerv rot  L=45*  R=75* @ start: L=53*  @ end:   L=63* @ start:   L: 55*  @end   75*   AROM appears very closely symmetrical     Cerv flex rot    Mild limit to left                                Strength                                                      TESTS/ OTHER         C2 mobility  WFL, no P       Red flags  Denies visual/ speech/ numbness       palpation    @ start: mod tender L upper trap    After mobs: mod reduction in tenderness                           RESTRICTIONS/PRECAUTIONS:     Exercises/Interventions:   Therapeutic Ex (06602)  Min: 4' Resistance/Repetitions Notes                       Cervicothoracic stab >         Lumbosacral ex's >future prn    Pt edu Updated HEP; thorough review; see below Role of progressing strength ex's vs pain relieving ex's   Manual Intervention ([phone])  Min: 26     Cervical manual Man dist, side glides - mid grade, SO STM, upglides- mid/high grade low amp thrusts    STM    Lumbosacral/ coccyx manual >assess future prn    Thoracic manual Prone PA unilat/ bilat w/ screw: mid to high grade Few cavitations   CT jxn manual Prone CT jxn rot mid to high grade 1 small cavitation on R        NMR re-education (28456)  Min: 20'     Prone CT retract Retract:   8sx8    Retract + rotate:  3s x 10 ea Mild/ mod cues and cues to work into end range per QPID Health retract w/OP    Seated craniocerv flex    Seated \"posture practice\"    Self cervical traction          Cerv rot wall ball Cues for posture/ form   Wall angels Cues for posture/ form   Seated TB Cerv Retract 10s x 10 w/ Grn TB Mod cues for form   Seated TB HAB W/ cerv retraction   Seated scaption  Ball on post head @ wall:   1# bilat 2x15 Cues for end range retraction                            Therapeutic Activity (31089)  Min: 0     Pt edu reps  Seated Shoulder Horizontal Abduction with Resistance - Palms Down - 2-3 x daily - 4-6 x weekly - 2-3 sets - 10-15 reps  Mid/lower Cervical Rotation SNAG - 2-3 x daily - 4-6 x weekly - 5-15 reps - 2-8sec hold         Therapeutic Exercise and NMR EXR  [x] (26405) Provided verbal/tactile cueing for activities related to strengthening, flexibility, endurance, ROM  for improvements in cervical, postural, scapular, scapulothoracic and UE control with self care, reaching, carrying, lifting, house/yardwork, driving/computer work. [x] (14507) Provided verbal/tactile cueing for activities related to improving balance, coordination, kinesthetic sense, posture, motor skill, proprioception  to assist with cervical, scapular, scapulothoracic and UE control with self care, reaching, carrying, lifting, house/yardwork, driving/computer work. Therapeutic Activities:    [x] (23784 or 11393) Provided verbal/tactile cueing for activities related to improving balance, coordination, kinesthetic sense, posture, motor skill, proprioception and motor activation to allow for proper function of cervical, scapular, scapulothoracic and UE control with self care, carrying, lifting, driving/computer work.      Home Exercise Program:    [x] (92426) Reviewed/Progressed HEP activities related to strengthening, flexibility, endurance, ROM of cervical, scapular, scapulothoracic and UE control with self care, reaching, carrying, lifting, house/yardwork, driving/computer work  [x] (95281) Reviewed/Progressed HEP activities related to improving balance, coordination, kinesthetic sense, posture, motor skill, proprioception of cervical, scapular, scapulothoracic and UE control with self care, reaching, carrying, lifting, house/yardwork, driving/computer work      Manual Treatments:  PROM / STM / Oscillations-Mobs:  G-I, II, III, IV (PA's, Inf., Post.)  [x] (21401) Provided manual therapy to mobilize soft tissue/joints of cervical/CT, scapular GHJ and UE for the purpose of decreasing headache, modulating pain, promoting relaxation,  increasing ROM, reducing/eliminating soft tissue swelling/inflammation/restriction, improving soft tissue extensibility and allowing for proper ROM for normal function with self care, reaching, carrying, lifting, house/yardwork, driving/computer work      Approval Dates:  CPT Code Units Approved Units Used  Date Updated:                     Charges:  Timed Code Treatment Minutes: 50   Total Treatment Minutes: 50     [] EVAL (LOW) 56726 (typically 20 minutes face-to-face)  [] EVAL (MOD) 87491 (typically 30 minutes face-to-face)  [] EVAL (HIGH) 49126 (typically 45 minutes face-to-face)  [] RE-EVAL     [] WB(98424) x     [] Dry needle 1 or 2 Muscles (79286)  [x] NMR (09284) x     [] Dry needle 3+ Muscles (32657)  [x] Manual (41588) x  2   [] Ultrasound (39919) x  [] TA (46417) x     [] Mech Traction (88389)  [] ES(attended) (11842)     [] ES (un) (94183):   [] Vasopump (42736) [] Ionto (88283)   [] Other:    GOALS:  Patient stated goal: able to work full day w/o HA or neck pain. [] Progressing: [] Met: [] Not Met: [] Adjusted    Therapist goals for Patient:   Short Term Goals: To be achieved in: 2 weeks  1. Independent in HEP and progression per patient tolerance, in order to prevent re-injury. [] Progressing: [] Met: [] Not Met: [] Adjusted  2. Patient will have a decrease in pain to facilitate improvement in movement, function, and ADLs as indicated by Functional Deficits. [] Progressing: [] Met: [] Not Met: [] Adjusted    Long Term Goals: To be achieved in: up to 8 weeks  1. Increase FOTO functional outcome score from 56 to 67  to assist with reaching prior level of function. [] Progressing: [] Met: [] Not Met: [] Adjusted  2. Patient will demonstrate increased AROM to [facility] of cervical/thoracic spine to allow for proper joint functioning as indicated by patients Functional Deficits.   [] Progressing: [] Met: [] Not Met: [] Adjusted  3. Patient will demonstrate an increase in postural awareness and control and activation of  Deep cervical stabilizers to allow for proper functional mobility as indicated by patients Functional Deficits. [] Progressing: [] Met: [] Not Met: [] Adjusted  4. Patient will return to all functional activities without increased symptoms or restriction. [] Progressing: [] Met: [] Not Met: [] Adjusted    ASSESSMENT:  Pt con'ts to respond quite well. Cerv rot visually symmetrical in neutral, mild deficit w/ cervical flex + rot. Hypomobile L CT jxn w/ Rot, fatigues quickly w/ posterior cervical chain work. 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 Pt needs skilled PT to further restore cervical ROM, postural endurance and reduce need for medical care. Treatment/Activity Tolerance:  [x] Patient tolerated treatment well [] Patient limited by fatique  [] Patient limited by pain  [] Patient limited by other medical complications  [] Other:     Overall Progression Towards Functional goals/ Treatment Progress Update:  [] Patient is progressing as expected towards functional goals listed. [] Progression is slowed due to complexities/Impairments listed. [] Progression has been slowed due to co-morbidities. [x] Plan just implemented, too soon to assess goals progression <30days   [] Goals require adjustment due to lack of progress  [] Patient is not progressing as expected and requires additional follow up with physician  [] Other    Prognosis for POC: [x] Good [] Fair  [] Poor    Patient requires continued skilled intervention: [x] Yes  [] No        PLAN: See eval  [] Continue per plan of care [] Alter current plan (see comments)  [x] Plan of care initiated [] Hold pending MD visit [] Discharge    Electronically signed by:  Lauren Quiñonez, PT , DPT, OCS #[id number]      Note: If patient does not return for scheduled/recommended follow up visits, this note will serve as a discharge from care along with the most recent update on progress.

## 2022-08-23 ENCOUNTER — APPOINTMENT (OUTPATIENT)
Dept: PHYSICAL THERAPY | Age: 52
End: 2022-08-23
Payer: COMMERCIAL

## 2022-08-24 ENCOUNTER — APPOINTMENT (OUTPATIENT)
Dept: PHYSICAL THERAPY | Age: 52
End: 2022-08-24
Payer: COMMERCIAL

## 2022-08-25 ENCOUNTER — ANESTHESIA EVENT (OUTPATIENT)
Dept: OPERATING ROOM | Age: 52
End: 2022-08-25
Payer: COMMERCIAL

## 2022-08-25 ENCOUNTER — APPOINTMENT (OUTPATIENT)
Dept: CT IMAGING | Age: 52
End: 2022-08-25
Payer: COMMERCIAL

## 2022-08-25 ENCOUNTER — HOSPITAL ENCOUNTER (OUTPATIENT)
Age: 52
Setting detail: OBSERVATION
Discharge: HOME | End: 2022-08-28
Attending: EMERGENCY MEDICINE | Admitting: SURGERY
Payer: COMMERCIAL

## 2022-08-25 ENCOUNTER — ANESTHESIA (OUTPATIENT)
Dept: OPERATING ROOM | Age: 52
End: 2022-08-25
Payer: COMMERCIAL

## 2022-08-25 DIAGNOSIS — Z90.49 S/P LAPAROSCOPIC APPENDECTOMY: ICD-10-CM

## 2022-08-25 DIAGNOSIS — K35.20 ACUTE APPENDICITIS WITH GENERALIZED PERITONITIS WITHOUT GANGRENE, UNSPECIFIED WHETHER ABSCESS PRESENT, UNSPECIFIED WHETHER PERFORATION PRESENT: Primary | ICD-10-CM

## 2022-08-25 DIAGNOSIS — K35.80 ACUTE APPENDICITIS, UNSPECIFIED ACUTE APPENDICITIS TYPE: ICD-10-CM

## 2022-08-25 PROBLEM — K65.0 PURULENT PERITONITIS (HCC): Status: ACTIVE | Noted: 2022-08-25

## 2022-08-25 LAB
A/G RATIO: 1.6 (ref 1.1–2.2)
ALBUMIN SERPL-MCNC: 4.3 G/DL (ref 3.4–5)
ALP BLD-CCNC: 89 U/L (ref 40–129)
ALT SERPL-CCNC: 13 U/L (ref 10–40)
ANION GAP SERPL CALCULATED.3IONS-SCNC: 14 MMOL/L (ref 3–16)
AST SERPL-CCNC: 14 U/L (ref 15–37)
BACTERIA: ABNORMAL /HPF
BASOPHILS ABSOLUTE: 0 K/UL (ref 0–0.2)
BASOPHILS RELATIVE PERCENT: 0.3 %
BILIRUB SERPL-MCNC: 1.1 MG/DL (ref 0–1)
BILIRUBIN URINE: NEGATIVE
BLOOD, URINE: ABNORMAL
BUN BLDV-MCNC: 10 MG/DL (ref 7–20)
CALCIUM SERPL-MCNC: 9.3 MG/DL (ref 8.3–10.6)
CHLORIDE BLD-SCNC: 97 MMOL/L (ref 99–110)
CLARITY: CLEAR
CO2: 22 MMOL/L (ref 21–32)
COLOR: YELLOW
CREAT SERPL-MCNC: 1 MG/DL (ref 0.6–1.1)
EOSINOPHILS ABSOLUTE: 0 K/UL (ref 0–0.6)
EOSINOPHILS RELATIVE PERCENT: 0 %
EPITHELIAL CELLS, UA: 11 /HPF (ref 0–5)
GFR AFRICAN AMERICAN: >60
GFR NON-AFRICAN AMERICAN: 58
GLUCOSE BLD-MCNC: 164 MG/DL (ref 70–99)
GLUCOSE URINE: NEGATIVE MG/DL
HCG QUALITATIVE: NEGATIVE
HCT VFR BLD CALC: 42.5 % (ref 36–48)
HEMOGLOBIN: 14.2 G/DL (ref 12–16)
HYALINE CASTS: 0 /LPF (ref 0–8)
KETONES, URINE: 15 MG/DL
LEUKOCYTE ESTERASE, URINE: NEGATIVE
LIPASE: 40 U/L (ref 13–60)
LYMPHOCYTES ABSOLUTE: 0.6 K/UL (ref 1–5.1)
LYMPHOCYTES RELATIVE PERCENT: 4.3 %
MCH RBC QN AUTO: 29 PG (ref 26–34)
MCHC RBC AUTO-ENTMCNC: 33.4 G/DL (ref 31–36)
MCV RBC AUTO: 86.9 FL (ref 80–100)
MICROSCOPIC EXAMINATION: YES
MONOCYTES ABSOLUTE: 0.7 K/UL (ref 0–1.3)
MONOCYTES RELATIVE PERCENT: 4.6 %
NEUTROPHILS ABSOLUTE: 13.5 K/UL (ref 1.7–7.7)
NEUTROPHILS RELATIVE PERCENT: 90.8 %
NITRITE, URINE: NEGATIVE
PDW BLD-RTO: 14.4 % (ref 12.4–15.4)
PH UA: 5 (ref 5–8)
PLATELET # BLD: 240 K/UL (ref 135–450)
PMV BLD AUTO: 9.1 FL (ref 5–10.5)
POTASSIUM REFLEX MAGNESIUM: 3.9 MMOL/L (ref 3.5–5.1)
PROTEIN UA: ABNORMAL MG/DL
RBC # BLD: 4.89 M/UL (ref 4–5.2)
RBC UA: 28 /HPF (ref 0–4)
SODIUM BLD-SCNC: 133 MMOL/L (ref 136–145)
SPECIFIC GRAVITY UA: 1.09 (ref 1–1.03)
TOTAL PROTEIN: 7 G/DL (ref 6.4–8.2)
URINE REFLEX TO CULTURE: ABNORMAL
URINE TYPE: ABNORMAL
UROBILINOGEN, URINE: 0.2 E.U./DL
WBC # BLD: 14.9 K/UL (ref 4–11)
WBC UA: ABNORMAL /HPF (ref 0–5)

## 2022-08-25 PROCEDURE — A4217 STERILE WATER/SALINE, 500 ML: HCPCS | Performed by: SURGERY

## 2022-08-25 PROCEDURE — 7100000001 HC PACU RECOVERY - ADDTL 15 MIN: Performed by: SURGERY

## 2022-08-25 PROCEDURE — 80053 COMPREHEN METABOLIC PANEL: CPT

## 2022-08-25 PROCEDURE — 2580000003 HC RX 258: Performed by: SURGERY

## 2022-08-25 PROCEDURE — 2500000003 HC RX 250 WO HCPCS: Performed by: NURSE ANESTHETIST, CERTIFIED REGISTERED

## 2022-08-25 PROCEDURE — 99219 PR INITIAL OBSERVATION CARE/DAY 50 MINUTES: CPT | Performed by: SURGERY

## 2022-08-25 PROCEDURE — 2709999900 HC NON-CHARGEABLE SUPPLY: Performed by: SURGERY

## 2022-08-25 PROCEDURE — 85025 COMPLETE CBC W/AUTO DIFF WBC: CPT

## 2022-08-25 PROCEDURE — 6360000002 HC RX W HCPCS: Performed by: SURGERY

## 2022-08-25 PROCEDURE — 2500000003 HC RX 250 WO HCPCS: Performed by: SURGERY

## 2022-08-25 PROCEDURE — 3700000000 HC ANESTHESIA ATTENDED CARE: Performed by: SURGERY

## 2022-08-25 PROCEDURE — 6360000002 HC RX W HCPCS: Performed by: NURSE ANESTHETIST, CERTIFIED REGISTERED

## 2022-08-25 PROCEDURE — 3600000014 HC SURGERY LEVEL 4 ADDTL 15MIN: Performed by: SURGERY

## 2022-08-25 PROCEDURE — 96374 THER/PROPH/DIAG INJ IV PUSH: CPT

## 2022-08-25 PROCEDURE — 6360000002 HC RX W HCPCS: Performed by: EMERGENCY MEDICINE

## 2022-08-25 PROCEDURE — 6370000000 HC RX 637 (ALT 250 FOR IP): Performed by: SURGERY

## 2022-08-25 PROCEDURE — 2720000010 HC SURG SUPPLY STERILE: Performed by: SURGERY

## 2022-08-25 PROCEDURE — 2580000003 HC RX 258: Performed by: EMERGENCY MEDICINE

## 2022-08-25 PROCEDURE — 3700000001 HC ADD 15 MINUTES (ANESTHESIA): Performed by: SURGERY

## 2022-08-25 PROCEDURE — 7100000000 HC PACU RECOVERY - FIRST 15 MIN: Performed by: SURGERY

## 2022-08-25 PROCEDURE — 99285 EMERGENCY DEPT VISIT HI MDM: CPT

## 2022-08-25 PROCEDURE — G0378 HOSPITAL OBSERVATION PER HR: HCPCS

## 2022-08-25 PROCEDURE — 3600000004 HC SURGERY LEVEL 4 BASE: Performed by: SURGERY

## 2022-08-25 PROCEDURE — 74177 CT ABD & PELVIS W/CONTRAST: CPT

## 2022-08-25 PROCEDURE — 96375 TX/PRO/DX INJ NEW DRUG ADDON: CPT

## 2022-08-25 PROCEDURE — 44970 LAPAROSCOPY APPENDECTOMY: CPT | Performed by: SURGERY

## 2022-08-25 PROCEDURE — 36415 COLL VENOUS BLD VENIPUNCTURE: CPT

## 2022-08-25 PROCEDURE — 96372 THER/PROPH/DIAG INJ SC/IM: CPT

## 2022-08-25 PROCEDURE — 6360000004 HC RX CONTRAST MEDICATION: Performed by: EMERGENCY MEDICINE

## 2022-08-25 PROCEDURE — 84703 CHORIONIC GONADOTROPIN ASSAY: CPT

## 2022-08-25 PROCEDURE — 81001 URINALYSIS AUTO W/SCOPE: CPT

## 2022-08-25 PROCEDURE — 2580000003 HC RX 258: Performed by: NURSE ANESTHETIST, CERTIFIED REGISTERED

## 2022-08-25 PROCEDURE — 83690 ASSAY OF LIPASE: CPT

## 2022-08-25 PROCEDURE — 94760 N-INVAS EAR/PLS OXIMETRY 1: CPT

## 2022-08-25 PROCEDURE — 88304 TISSUE EXAM BY PATHOLOGIST: CPT

## 2022-08-25 RX ORDER — SODIUM CHLORIDE 9 MG/ML
INJECTION, SOLUTION INTRAVENOUS PRN
Status: DISCONTINUED | OUTPATIENT
Start: 2022-08-25 | End: 2022-08-25 | Stop reason: HOSPADM

## 2022-08-25 RX ORDER — MAGNESIUM HYDROXIDE 1200 MG/15ML
LIQUID ORAL CONTINUOUS PRN
Status: DISCONTINUED | OUTPATIENT
Start: 2022-08-25 | End: 2022-08-25 | Stop reason: HOSPADM

## 2022-08-25 RX ORDER — SODIUM CHLORIDE 9 MG/ML
INJECTION, SOLUTION INTRAVENOUS PRN
Status: DISCONTINUED | OUTPATIENT
Start: 2022-08-25 | End: 2022-08-28 | Stop reason: HOSPADM

## 2022-08-25 RX ORDER — LABETALOL HYDROCHLORIDE 5 MG/ML
INJECTION, SOLUTION INTRAVENOUS PRN
Status: DISCONTINUED | OUTPATIENT
Start: 2022-08-25 | End: 2022-08-25 | Stop reason: SDUPTHER

## 2022-08-25 RX ORDER — OXYCODONE HYDROCHLORIDE 10 MG/1
10 TABLET ORAL EVERY 4 HOURS PRN
Status: DISCONTINUED | OUTPATIENT
Start: 2022-08-25 | End: 2022-08-28 | Stop reason: HOSPADM

## 2022-08-25 RX ORDER — GLYCOPYRROLATE 0.2 MG/ML
INJECTION INTRAMUSCULAR; INTRAVENOUS PRN
Status: DISCONTINUED | OUTPATIENT
Start: 2022-08-25 | End: 2022-08-25 | Stop reason: SDUPTHER

## 2022-08-25 RX ORDER — OXYCODONE HYDROCHLORIDE 5 MG/1
5 TABLET ORAL PRN
Status: DISCONTINUED | OUTPATIENT
Start: 2022-08-25 | End: 2022-08-25 | Stop reason: HOSPADM

## 2022-08-25 RX ORDER — ROCURONIUM BROMIDE 10 MG/ML
INJECTION, SOLUTION INTRAVENOUS PRN
Status: DISCONTINUED | OUTPATIENT
Start: 2022-08-25 | End: 2022-08-25 | Stop reason: SDUPTHER

## 2022-08-25 RX ORDER — ONDANSETRON 2 MG/ML
4 INJECTION INTRAMUSCULAR; INTRAVENOUS
Status: DISCONTINUED | OUTPATIENT
Start: 2022-08-25 | End: 2022-08-25 | Stop reason: HOSPADM

## 2022-08-25 RX ORDER — DEXTROSE AND SODIUM CHLORIDE 5; .45 G/100ML; G/100ML
INJECTION, SOLUTION INTRAVENOUS CONTINUOUS
Status: DISCONTINUED | OUTPATIENT
Start: 2022-08-25 | End: 2022-08-25

## 2022-08-25 RX ORDER — SODIUM CHLORIDE 0.9 % (FLUSH) 0.9 %
5-40 SYRINGE (ML) INJECTION PRN
Status: DISCONTINUED | OUTPATIENT
Start: 2022-08-25 | End: 2022-08-28 | Stop reason: HOSPADM

## 2022-08-25 RX ORDER — LIDOCAINE HYDROCHLORIDE 20 MG/ML
INJECTION, SOLUTION EPIDURAL; INFILTRATION; INTRACAUDAL; PERINEURAL PRN
Status: DISCONTINUED | OUTPATIENT
Start: 2022-08-25 | End: 2022-08-25 | Stop reason: SDUPTHER

## 2022-08-25 RX ORDER — FENTANYL CITRATE 50 UG/ML
25 INJECTION, SOLUTION INTRAMUSCULAR; INTRAVENOUS EVERY 5 MIN PRN
Status: DISCONTINUED | OUTPATIENT
Start: 2022-08-25 | End: 2022-08-25 | Stop reason: HOSPADM

## 2022-08-25 RX ORDER — MIDAZOLAM HYDROCHLORIDE 1 MG/ML
INJECTION INTRAMUSCULAR; INTRAVENOUS PRN
Status: DISCONTINUED | OUTPATIENT
Start: 2022-08-25 | End: 2022-08-25 | Stop reason: SDUPTHER

## 2022-08-25 RX ORDER — ONDANSETRON 2 MG/ML
8 INJECTION INTRAMUSCULAR; INTRAVENOUS ONCE
Status: COMPLETED | OUTPATIENT
Start: 2022-08-25 | End: 2022-08-25

## 2022-08-25 RX ORDER — OXYCODONE HYDROCHLORIDE 5 MG/1
5 TABLET ORAL EVERY 4 HOURS PRN
Status: DISCONTINUED | OUTPATIENT
Start: 2022-08-25 | End: 2022-08-28 | Stop reason: HOSPADM

## 2022-08-25 RX ORDER — SODIUM CHLORIDE, SODIUM LACTATE, POTASSIUM CHLORIDE, CALCIUM CHLORIDE 600; 310; 30; 20 MG/100ML; MG/100ML; MG/100ML; MG/100ML
INJECTION, SOLUTION INTRAVENOUS CONTINUOUS
Status: DISCONTINUED | OUTPATIENT
Start: 2022-08-25 | End: 2022-08-28 | Stop reason: HOSPADM

## 2022-08-25 RX ORDER — OXYCODONE HYDROCHLORIDE 10 MG/1
10 TABLET ORAL PRN
Status: DISCONTINUED | OUTPATIENT
Start: 2022-08-25 | End: 2022-08-25 | Stop reason: HOSPADM

## 2022-08-25 RX ORDER — SODIUM CHLORIDE 0.9 % (FLUSH) 0.9 %
5-40 SYRINGE (ML) INJECTION EVERY 12 HOURS SCHEDULED
Status: DISCONTINUED | OUTPATIENT
Start: 2022-08-25 | End: 2022-08-28 | Stop reason: HOSPADM

## 2022-08-25 RX ORDER — 0.9 % SODIUM CHLORIDE 0.9 %
1000 INTRAVENOUS SOLUTION INTRAVENOUS ONCE
Status: COMPLETED | OUTPATIENT
Start: 2022-08-25 | End: 2022-08-25

## 2022-08-25 RX ORDER — MAGNESIUM SULFATE IN WATER 40 MG/ML
2000 INJECTION, SOLUTION INTRAVENOUS PRN
Status: DISCONTINUED | OUTPATIENT
Start: 2022-08-25 | End: 2022-08-28 | Stop reason: HOSPADM

## 2022-08-25 RX ORDER — ONDANSETRON 2 MG/ML
INJECTION INTRAMUSCULAR; INTRAVENOUS PRN
Status: DISCONTINUED | OUTPATIENT
Start: 2022-08-25 | End: 2022-08-25 | Stop reason: SDUPTHER

## 2022-08-25 RX ORDER — PROPOFOL 10 MG/ML
INJECTION, EMULSION INTRAVENOUS PRN
Status: DISCONTINUED | OUTPATIENT
Start: 2022-08-25 | End: 2022-08-25 | Stop reason: SDUPTHER

## 2022-08-25 RX ORDER — FENTANYL CITRATE 50 UG/ML
50 INJECTION, SOLUTION INTRAMUSCULAR; INTRAVENOUS EVERY 5 MIN PRN
Status: DISCONTINUED | OUTPATIENT
Start: 2022-08-25 | End: 2022-08-25 | Stop reason: HOSPADM

## 2022-08-25 RX ORDER — ONDANSETRON 4 MG/1
4 TABLET, ORALLY DISINTEGRATING ORAL EVERY 8 HOURS PRN
Status: DISCONTINUED | OUTPATIENT
Start: 2022-08-25 | End: 2022-08-28 | Stop reason: HOSPADM

## 2022-08-25 RX ORDER — ONDANSETRON 2 MG/ML
4 INJECTION INTRAMUSCULAR; INTRAVENOUS EVERY 6 HOURS PRN
Status: DISCONTINUED | OUTPATIENT
Start: 2022-08-25 | End: 2022-08-28 | Stop reason: HOSPADM

## 2022-08-25 RX ORDER — SODIUM CHLORIDE 0.9 % (FLUSH) 0.9 %
5-40 SYRINGE (ML) INJECTION PRN
Status: DISCONTINUED | OUTPATIENT
Start: 2022-08-25 | End: 2022-08-25 | Stop reason: HOSPADM

## 2022-08-25 RX ORDER — PANTOPRAZOLE SODIUM 40 MG/1
40 TABLET, DELAYED RELEASE ORAL DAILY
Status: DISCONTINUED | OUTPATIENT
Start: 2022-08-25 | End: 2022-08-28 | Stop reason: HOSPADM

## 2022-08-25 RX ORDER — FENTANYL CITRATE 50 UG/ML
INJECTION, SOLUTION INTRAMUSCULAR; INTRAVENOUS PRN
Status: DISCONTINUED | OUTPATIENT
Start: 2022-08-25 | End: 2022-08-25 | Stop reason: SDUPTHER

## 2022-08-25 RX ORDER — SUCCINYLCHOLINE/SOD CL,ISO/PF 200MG/10ML
SYRINGE (ML) INTRAVENOUS PRN
Status: DISCONTINUED | OUTPATIENT
Start: 2022-08-25 | End: 2022-08-25 | Stop reason: SDUPTHER

## 2022-08-25 RX ORDER — SODIUM CHLORIDE 9 MG/ML
INJECTION, SOLUTION INTRAVENOUS CONTINUOUS PRN
Status: DISCONTINUED | OUTPATIENT
Start: 2022-08-25 | End: 2022-08-25 | Stop reason: SDUPTHER

## 2022-08-25 RX ORDER — POTASSIUM CHLORIDE 7.45 MG/ML
10 INJECTION INTRAVENOUS PRN
Status: DISCONTINUED | OUTPATIENT
Start: 2022-08-25 | End: 2022-08-28 | Stop reason: HOSPADM

## 2022-08-25 RX ORDER — MEPERIDINE HYDROCHLORIDE 25 MG/ML
12.5 INJECTION INTRAMUSCULAR; INTRAVENOUS; SUBCUTANEOUS EVERY 5 MIN PRN
Status: DISCONTINUED | OUTPATIENT
Start: 2022-08-25 | End: 2022-08-25 | Stop reason: HOSPADM

## 2022-08-25 RX ORDER — METRONIDAZOLE 500 MG/100ML
500 INJECTION, SOLUTION INTRAVENOUS EVERY 8 HOURS
Status: DISCONTINUED | OUTPATIENT
Start: 2022-08-25 | End: 2022-08-28 | Stop reason: HOSPADM

## 2022-08-25 RX ORDER — ENOXAPARIN SODIUM 100 MG/ML
30 INJECTION SUBCUTANEOUS 2 TIMES DAILY
Status: DISCONTINUED | OUTPATIENT
Start: 2022-08-25 | End: 2022-08-28 | Stop reason: HOSPADM

## 2022-08-25 RX ORDER — CIPROFLOXACIN 2 MG/ML
400 INJECTION, SOLUTION INTRAVENOUS EVERY 12 HOURS
Status: DISCONTINUED | OUTPATIENT
Start: 2022-08-25 | End: 2022-08-28 | Stop reason: HOSPADM

## 2022-08-25 RX ORDER — DEXAMETHASONE SODIUM PHOSPHATE 4 MG/ML
INJECTION, SOLUTION INTRA-ARTICULAR; INTRALESIONAL; INTRAMUSCULAR; INTRAVENOUS; SOFT TISSUE PRN
Status: DISCONTINUED | OUTPATIENT
Start: 2022-08-25 | End: 2022-08-25 | Stop reason: SDUPTHER

## 2022-08-25 RX ORDER — BUPIVACAINE HYDROCHLORIDE 5 MG/ML
INJECTION, SOLUTION EPIDURAL; INTRACAUDAL
Status: COMPLETED | OUTPATIENT
Start: 2022-08-25 | End: 2022-08-25

## 2022-08-25 RX ORDER — SODIUM CHLORIDE 0.9 % (FLUSH) 0.9 %
5-40 SYRINGE (ML) INJECTION EVERY 12 HOURS SCHEDULED
Status: DISCONTINUED | OUTPATIENT
Start: 2022-08-25 | End: 2022-08-25 | Stop reason: HOSPADM

## 2022-08-25 RX ORDER — ACETAMINOPHEN 325 MG/1
650 TABLET ORAL EVERY 4 HOURS PRN
Status: DISCONTINUED | OUTPATIENT
Start: 2022-08-25 | End: 2022-08-28 | Stop reason: HOSPADM

## 2022-08-25 RX ADMIN — DEXTROSE AND SODIUM CHLORIDE: 5; 450 INJECTION, SOLUTION INTRAVENOUS at 03:18

## 2022-08-25 RX ADMIN — FENTANYL CITRATE 100 MCG: 50 INJECTION INTRAMUSCULAR; INTRAVENOUS at 12:11

## 2022-08-25 RX ADMIN — Medication 120 MG: at 12:14

## 2022-08-25 RX ADMIN — SODIUM CHLORIDE: 9 INJECTION, SOLUTION INTRAVENOUS at 13:11

## 2022-08-25 RX ADMIN — ROCURONIUM BROMIDE 30 MG: 10 INJECTION, SOLUTION INTRAVENOUS at 12:17

## 2022-08-25 RX ADMIN — ENOXAPARIN SODIUM 30 MG: 100 INJECTION SUBCUTANEOUS at 20:27

## 2022-08-25 RX ADMIN — ONDANSETRON 4 MG: 2 INJECTION INTRAMUSCULAR; INTRAVENOUS at 12:19

## 2022-08-25 RX ADMIN — IOPAMIDOL 75 ML: 755 INJECTION, SOLUTION INTRAVENOUS at 01:59

## 2022-08-25 RX ADMIN — PROPOFOL 200 MG: 10 INJECTION, EMULSION INTRAVENOUS at 12:14

## 2022-08-25 RX ADMIN — SODIUM CHLORIDE, POTASSIUM CHLORIDE, SODIUM LACTATE AND CALCIUM CHLORIDE: 600; 310; 30; 20 INJECTION, SOLUTION INTRAVENOUS at 22:37

## 2022-08-25 RX ADMIN — LABETALOL HYDROCHLORIDE 5 MG: 5 INJECTION, SOLUTION INTRAVENOUS at 12:42

## 2022-08-25 RX ADMIN — ONDANSETRON 8 MG: 2 INJECTION INTRAMUSCULAR; INTRAVENOUS at 01:09

## 2022-08-25 RX ADMIN — CIPROFLOXACIN 400 MG: 2 INJECTION, SOLUTION INTRAVENOUS at 10:36

## 2022-08-25 RX ADMIN — HYDROMORPHONE HYDROCHLORIDE 0.5 MG: 1 INJECTION, SOLUTION INTRAMUSCULAR; INTRAVENOUS; SUBCUTANEOUS at 13:07

## 2022-08-25 RX ADMIN — SODIUM CHLORIDE, PRESERVATIVE FREE 10 ML: 5 INJECTION INTRAVENOUS at 08:16

## 2022-08-25 RX ADMIN — LIDOCAINE HYDROCHLORIDE 60 MG: 20 INJECTION, SOLUTION EPIDURAL; INFILTRATION; INTRACAUDAL; PERINEURAL at 12:14

## 2022-08-25 RX ADMIN — HYDROMORPHONE HYDROCHLORIDE 1 MG: 1 INJECTION, SOLUTION INTRAMUSCULAR; INTRAVENOUS; SUBCUTANEOUS at 08:16

## 2022-08-25 RX ADMIN — SODIUM CHLORIDE: 9 INJECTION, SOLUTION INTRAVENOUS at 12:10

## 2022-08-25 RX ADMIN — METRONIDAZOLE 500 MG: 500 INJECTION, SOLUTION INTRAVENOUS at 18:08

## 2022-08-25 RX ADMIN — DEXAMETHASONE SODIUM PHOSPHATE 8 MG: 4 INJECTION, SOLUTION INTRAMUSCULAR; INTRAVENOUS at 12:19

## 2022-08-25 RX ADMIN — HYDROMORPHONE HYDROCHLORIDE 1 MG: 1 INJECTION, SOLUTION INTRAMUSCULAR; INTRAVENOUS; SUBCUTANEOUS at 20:21

## 2022-08-25 RX ADMIN — SUGAMMADEX 200 MG: 100 INJECTION, SOLUTION INTRAVENOUS at 13:10

## 2022-08-25 RX ADMIN — HYDROMORPHONE HYDROCHLORIDE 0.5 MG: 1 INJECTION, SOLUTION INTRAMUSCULAR; INTRAVENOUS; SUBCUTANEOUS at 13:10

## 2022-08-25 RX ADMIN — GLYCOPYRROLATE 0.2 MG: 0.2 INJECTION, SOLUTION INTRAMUSCULAR; INTRAVENOUS at 12:19

## 2022-08-25 RX ADMIN — CIPROFLOXACIN 400 MG: 2 INJECTION, SOLUTION INTRAVENOUS at 22:43

## 2022-08-25 RX ADMIN — ACETAMINOPHEN 650 MG: 325 TABLET, FILM COATED ORAL at 22:40

## 2022-08-25 RX ADMIN — SODIUM CHLORIDE, POTASSIUM CHLORIDE, SODIUM LACTATE AND CALCIUM CHLORIDE: 600; 310; 30; 20 INJECTION, SOLUTION INTRAVENOUS at 08:19

## 2022-08-25 RX ADMIN — HYDROMORPHONE HYDROCHLORIDE 1 MG: 1 INJECTION, SOLUTION INTRAMUSCULAR; INTRAVENOUS; SUBCUTANEOUS at 01:09

## 2022-08-25 RX ADMIN — SODIUM CHLORIDE, PRESERVATIVE FREE 10 ML: 5 INJECTION INTRAVENOUS at 20:21

## 2022-08-25 RX ADMIN — HYDROMORPHONE HYDROCHLORIDE 1 MG: 1 INJECTION, SOLUTION INTRAMUSCULAR; INTRAVENOUS; SUBCUTANEOUS at 03:23

## 2022-08-25 RX ADMIN — MIDAZOLAM 2 MG: 1 INJECTION INTRAMUSCULAR; INTRAVENOUS at 12:10

## 2022-08-25 RX ADMIN — SODIUM CHLORIDE 1000 ML: 9 INJECTION, SOLUTION INTRAVENOUS at 01:03

## 2022-08-25 RX ADMIN — OXYCODONE 5 MG: 5 TABLET ORAL at 18:02

## 2022-08-25 RX ADMIN — METRONIDAZOLE 500 MG: 500 INJECTION, SOLUTION INTRAVENOUS at 12:10

## 2022-08-25 RX ADMIN — SODIUM CHLORIDE 3000 MG: 900 INJECTION INTRAVENOUS at 03:26

## 2022-08-25 RX ADMIN — ROCURONIUM BROMIDE 10 MG: 10 INJECTION, SOLUTION INTRAVENOUS at 12:57

## 2022-08-25 ASSESSMENT — PAIN SCALES - GENERAL
PAINLEVEL_OUTOF10: 4
PAINLEVEL_OUTOF10: 0
PAINLEVEL_OUTOF10: 7
PAINLEVEL_OUTOF10: 7
PAINLEVEL_OUTOF10: 10
PAINLEVEL_OUTOF10: 0
PAINLEVEL_OUTOF10: 7
PAINLEVEL_OUTOF10: 8
PAINLEVEL_OUTOF10: 8
PAINLEVEL_OUTOF10: 7
PAINLEVEL_OUTOF10: 10
PAINLEVEL_OUTOF10: 8

## 2022-08-25 ASSESSMENT — PAIN DESCRIPTION - ORIENTATION
ORIENTATION: LEFT
ORIENTATION: MID

## 2022-08-25 ASSESSMENT — PAIN DESCRIPTION - LOCATION
LOCATION: ABDOMEN

## 2022-08-25 ASSESSMENT — PAIN DESCRIPTION - ONSET: ONSET: ON-GOING

## 2022-08-25 ASSESSMENT — ENCOUNTER SYMPTOMS: SHORTNESS OF BREATH: 0

## 2022-08-25 ASSESSMENT — PAIN DESCRIPTION - DESCRIPTORS
DESCRIPTORS: PRESSURE
DESCRIPTORS: ACHING

## 2022-08-25 ASSESSMENT — PAIN - FUNCTIONAL ASSESSMENT
PAIN_FUNCTIONAL_ASSESSMENT: 0-10
PAIN_FUNCTIONAL_ASSESSMENT: PREVENTS OR INTERFERES SOME ACTIVE ACTIVITIES AND ADLS
PAIN_FUNCTIONAL_ASSESSMENT: 0-10

## 2022-08-25 ASSESSMENT — LIFESTYLE VARIABLES: SMOKING_STATUS: 0

## 2022-08-25 ASSESSMENT — PAIN DESCRIPTION - FREQUENCY: FREQUENCY: CONTINUOUS

## 2022-08-25 ASSESSMENT — PAIN DESCRIPTION - PAIN TYPE: TYPE: ACUTE PAIN

## 2022-08-25 NOTE — OP NOTE
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 We first took made a mesenteric window at the base of the appendix with the ligasure. The appendix was then transected with a PILI stapler with a blue load along the cecum to get back to healthy viable tissue. There was no encroachment on the terminal ileum. The mesoappendix was then taken down with the ligasure towards the tip. This was challenging as it was stuck in the pelvis due to the inflammation. It was able to be removed in its entirety. The appendix was then placed in a laparoscopic retrieval bag and removed from the abdomen. All fluid was suctioned out of the pelvis and it was thoroughly irrigated until clear. Al free fluid was suctioned out. We returned our attention back to the surgical site and staple line was intact and hemostatic. The mesoappendix itself was hemostatic as well. At that point, no additional pathology was encountered throughout the abdomen. The 10 mm port was then   removed and the fascia closed with an 0 Vicryl using a laparoscopic suture   passer. The abdomen was then desufflated. The remaining trocars were removed. The skin was closed with 4-0 Vicryl. Long-acting anesthetic was injected at   the end. Dermabond was applied to the   wounds. The patient tolerated the procedure well. She was extubated in the   operating room and taken to PACU in stable condition. All counts were   correct at the end of the case.     Complications: none    EBL: less than 50 ml    Specimen: appendix    Electronically signed by Shen Stover MD on 8/25/2022 at 1:10 PM

## 2022-08-25 NOTE — ANESTHESIA PRE PROCEDURE
Department of Anesthesiology  Preprocedure Note       Name:  Lucía Chi   Age:  47 y.o.  :  1970                                          MRN:  [id number]         Date:  2022      Surgeon: Aga Snyder):  Jose Manuel Dasilva MD    Procedure: Procedure(s):  LAPAROSCOPIC APPENDECTOMY, POSSIBLE OPEN    Medications prior to admission:   Prior to Admission medications    Medication Sig Start Date End Date Taking?  Authorizing Provider   diclofenac (VOLTAREN) 50 MG EC tablet Take 1 tablet by mouth 2 times daily as needed for Pain 22   Wenceslao Quintero MD       Current medications:    Current Facility-Administered Medications   Medication Dose Route Frequency Provider Last Rate Last Admin    sodium chloride flush 0.9 % injection 5-40 mL  5-40 mL IntraVENous 2 times per day Jose Manuel Dasilva MD   10 mL at 22 0816    sodium chloride flush 0.9 % injection 5-40 mL  5-40 mL IntraVENous PRN Jose Manuel Dasilva MD        0.9 % sodium chloride infusion   IntraVENous PRN Jose Manuel Dasilva MD        ondansetron (ZOFRAN-ODT) disintegrating tablet 4 mg  4 mg Oral Q8H PRN Jose Manuel Dasilva MD        Or    [facility]etron [facility]) injection 4 mg  4 mg IntraVENous Q6H PRN Jose Manuel Dasilva MD        enoxaparin Sodium (LOVENOX) injection 30 mg  30 mg SubCUTAneous BID Jose Manuel Dasilva MD        lactated ringers infusion   IntraVENous Continuous Jose Manuel Dasilva  mL/hr at 22 0819 New Bag at 22 0819    potassium chloride 10 mEq/100 mL IVPB (Peripheral Line)  10 mEq IntraVENous PRN Jose Manuel Dasilva MD        magnesium sulfate 2000 mg in 50 mL IVPB premix  2,000 mg IntraVENous PRN Jose Manuel Dasilva MD        metronidazole (FLAGYL) 500 mg in 0.9% NaCl 100 mL IVPB premix  500 mg IntraVENous Q8H Jose Manuel Dasilva MD        ciprofloxacin (CIPRO) IVPB 400 mg  400 mg IntraVENous Q12H Jose Manuel Dasilva  mL/hr at 22 1036 400 mg at 22 1036    acetaminophen (TYLENOL) tablet 650 mg  650 mg Oral Q4H PRN Woody Shin MD        oxyCODONE (ROXICODONE) immediate release tablet 5 mg  5 mg Oral Q4H PRN Woody Shin MD        Or   Parag Lyon oxyCODONE HCl (OXY-IR) immediate release tablet 10 mg  10 mg Oral Q4H PRN Woody Shin MD        HYDROmorphone (DILAUDID) injection 0.5 mg  0.5 mg IntraVENous Q2H PRN Woody Shin MD        Or    HYDROmorphone (DILAUDID) injection 1 mg  1 mg IntraVENous Q2H PRN Woody Shin MD   1 mg at 22 0816    pantoprazole (PROTONIX) tablet 40 mg  40 mg Oral Daily Woody Shin MD           Allergies:  No Known Allergies    Problem List:    Patient Active Problem List   Diagnosis Code    Chondromalacia, patella M22.40    Obstructive sleep apnea G47.33    Migraine without status migrainosus, not intractable G43.909    Adhesive capsulitis of left shoulder M75.02    Acute appendicitis K35.80       Past Medical History:        Diagnosis Date    Broken tooth 2018    upper right    Capsulitis of shoulder, left     Dental bridge present     Dental crowns present     Migraine without status migrainosus, not intractable 2016    Sleep apnea 2016    uses CPAP sporatically        Past Surgical History:        Procedure Laterality Date     SECTION      x1    OTHER SURGICAL HISTORY Left 2018    Left Shoulder Manipulation/ Steroid Injection       Social History:    Social History     Tobacco Use    Smoking status: Former     Packs/day: 0.25     Types: Cigarettes     Quit date: 2020     Years since quittin.5    Smokeless tobacco: Current    Tobacco comments:     1 pk/wk   Substance Use Topics    Alcohol use: Yes     Comment: social                                Ready to quit: Not Answered  Counseling given: Not Answered  Tobacco comments: 1 pk/wk      Vital Signs (Current):   Vitals:    22 3987 08/25/22 0630 08/25/22 0808 08/25/22 1111   BP: 121/71  122/79 121/60   Pulse: 81  79 93   Resp: 16  16 16   Temp: 98.5 °F (36.9 °C)  98.6 °F (37 °C) 97 °F (36.1 °C)   TempSrc: Oral  Oral Temporal   SpO2: 95%  96% 94%   Weight:  184 lb 15.5 oz (83.9 kg)     Height: 5' 8\" (1.727 m)                                                 BP Readings from Last 3 Encounters:   08/25/22 121/60   07/12/22 100/70   06/05/18 100/67       NPO Status: Time of last liquid consumption: 1800                        Time of last solid consumption: 1800                        Date of last liquid consumption: 08/24/22                        Date of last solid food consumption: 08/24/22    BMI:   Wt Readings from Last 3 Encounters:   08/25/22 184 lb 15.5 oz (83.9 kg)   07/12/22 230 lb 12.8 oz (104.7 kg)   06/05/18 198 lb (89.8 kg)     Body mass index is 28.12 kg/m². CBC:   Lab Results   Component Value Date/Time    WBC 14.9 08/25/2022 01:00 AM    RBC 4.89 08/25/2022 01:00 AM    HGB 14.2 08/25/2022 01:00 AM    HCT 42.5 08/25/2022 01:00 AM    MCV 86.9 08/25/2022 01:00 AM    RDW 14.4 08/25/2022 01:00 AM     08/25/2022 01:00 AM       CMP:   Lab Results   Component Value Date/Time     08/25/2022 01:00 AM    K 3.9 08/25/2022 01:00 AM    CL 97 08/25/2022 01:00 AM    CO2 22 08/25/2022 01:00 AM    BUN 10 08/25/2022 01:00 AM    CREATININE 1.0 08/25/2022 01:00 AM    GFRAA >60 08/25/2022 01:00 AM    GFRAA >60 03/21/2011 09:35 AM    AGRATIO 1.6 08/25/2022 01:00 AM    LABGLOM 58 08/25/2022 01:00 AM    GLUCOSE 164 08/25/2022 01:00 AM    PROT 7.0 08/25/2022 01:00 AM    PROT 6.5 03/21/2011 09:35 AM    CALCIUM 9.3 08/25/2022 01:00 AM    BILITOT 1.1 08/25/2022 01:00 AM    ALKPHOS 89 08/25/2022 01:00 AM    AST 14 08/25/2022 01:00 AM    ALT 13 08/25/2022 01:00 AM       POC Tests: No results for input(s): POCGLU, POCNA, POCK, POCCL, POCBUN, POCHEMO, POCHCT in the last 72 hours.     Coags: No results found for: PROTIME, INR, APTT    HCG (If Applicable):   Lab Results   Component Value Date    PREGTESTUR Negative 05/09/2018        ABGs: No results found for: PHART, PO2ART, VSP1FRE, NOR2RNY, BEART, I3JCLQOH     Type & Screen (If Applicable):  No results found for: LABABO, LABRH    Drug/Infectious Status (If Applicable):  No results found for: HIV, HEPCAB    COVID-19 Screening (If Applicable):   Lab Results   Component Value Date/Time    COVID19 NOT DETECTED 12/09/2020 01:50 PM           Anesthesia Evaluation  Patient summary reviewed and Nursing notes reviewed no history of anesthetic complications:   Airway: Mallampati: II  TM distance: >3 FB   Neck ROM: full  Mouth opening: > = 3 FB   Dental:      Comment: No loose teeth    Pulmonary: breath sounds clear to auscultation  (+) sleep apnea: on CPAP,      (-) pneumonia, COPD, asthma, shortness of breath, recent URI and not a current smoker                           Cardiovascular:    (+) hyperlipidemia    (-) pacemaker, hypertension, valvular problems/murmurs, past MI, CAD, CABG/stent, dysrhythmias,  angina,  CHF, orthopnea, PND,  YUEN and no pulmonary hypertension      Rhythm: regular                      Neuro/Psych:   (+) headaches: migraine headaches,    (-) seizures, neuromuscular disease, TIA, CVA, psychiatric history and depression/anxiety            GI/Hepatic/Renal:        (-) hiatal hernia, GERD, PUD, hepatitis, liver disease, no renal disease, bowel prep and no morbid obesity      ROS comment: Acute appendicitis. Endo/Other:    (+) no malignancy/cancer. (-) diabetes mellitus, hypothyroidism, hyperthyroidism, blood dyscrasia, arthritis, no electrolyte abnormalities, no malignancy/cancer               Abdominal:             Vascular:     - PVD, DVT and PE. Other Findings:           Anesthesia Plan      general     ASA 2       Induction: intravenous. MIPS: Postoperative opioids intended and Prophylactic antiemetics administered.   Anesthetic plan and risks discussed with patient. Plan discussed with CRNA. Audra Bowers MD   8/25/2022        This pre-anesthesia assessment may be used as a history and physical.    DOS STAFF ADDENDUM:    Pt seen and examined, chart reviewed (including anesthesia, drug and allergy history). No interval changes to history and physical examination. Anesthetic plan, risks, benefits, alternatives, and personnel involved discussed with patient. Patient verbalized an understanding and agrees to proceed.       Audra Bowers MD  August 25, 2022  11:38 AM

## 2022-08-25 NOTE — ANESTHESIA POSTPROCEDURE EVALUATION
Department of Anesthesiology  Postprocedure Note    Patient: Lisa Dawley Day  MRN: [id number]  YOB: 1970  Date of evaluation: 8/25/2022      Procedure Summary     Date: 08/25/22 Room / Location: [address]    Anesthesia Start: 1210 Anesthesia Stop: 4856    Procedure: LAPAROSCOPIC APPENDECTOMY (Abdomen) Diagnosis:       Acute appendicitis, unspecified acute appendicitis type      (acute appendicitis)    Surgeons: Arnaldo Story MD Responsible Provider: Nayeli Jo MD    Anesthesia Type: General ASA Status: 2          Anesthesia Type: General    Ivonne Phase I: Ivonne Score: 8    Ivonne Phase II:        Anesthesia Post Evaluation    Patient location during evaluation: PACU  Patient participation: complete - patient participated  Level of consciousness: awake and alert  Pain score: 0  Airway patency: patent  Nausea & Vomiting: no nausea and no vomiting  Complications: no  Cardiovascular status: blood pressure returned to baseline  Respiratory status: acceptable  Hydration status: euvolemic

## 2022-08-25 NOTE — PLAN OF CARE
Problem: Discharge Planning  Goal: Discharge to home or other facility with appropriate resources  Outcome: Progressing     Problem: Pain  Goal: Verbalizes/displays adequate comfort level or baseline comfort level  Outcome: Progressing     Problem: ABCDS Injury Assessment  Goal: Absence of physical injury  Outcome: Progressing  Flowsheets (Taken 8/25/2022 1240)  Absence of Physical Injury: Implement safety measures based on patient assessment

## 2022-08-25 NOTE — H&P
General Surgery History and Physical        Lisa Chi   : 1970 MRN: [id number]  Date of Admission: 2022  Admitting Danika Arreguin MD  Primary Care Physician: Astrid Xiong MD      Chief complaint:  abdominal pain    Diagnosis Present on Admission:   Acute appendicitis      History of Present Illness  Lisa Chi is a 47 y.o. female admitted on 2022 for abdominal pain, found to have acute appendicitis. Pain has been present for 1 day. \"My whole belly hurts. \"   Location: started periumbilical and localized to RLQ. Associated with nausea. Thought she had food poisoning as pain started about an hour after eating dinner. Has been going to physical therapy for a neck injury. CT appendicitis. WBC 14.9.        Past Medical History:   Diagnosis Date    Broken tooth 2018    upper right    Capsulitis of shoulder, left     Dental bridge present     Dental crowns present     Migraine without status migrainosus, not intractable 2016    Sleep apnea 2016    uses CPAP sporatically      Past Surgical History:   Procedure Laterality Date     SECTION      x1    OTHER SURGICAL HISTORY Left 2018    Left Shoulder Manipulation/ Steroid Injection     Family History   Problem Relation Age of Onset    Other Sister         NETO    High Blood Pressure Father     Other Father         NETO    COPD Father     Heart Disease Father      Social History     Socioeconomic History    Marital status:      Spouse name: Not on file    Number of children: 1    Years of education: Not on file    Highest education level: Not on file   Occupational History    Occupation: Senior    Tobacco Use    Smoking status: Former     Packs/day: 0.25     Types: Cigarettes     Quit date: 2020     Years since quittin.5    Smokeless tobacco: Current    Tobacco comments:     1 pk/wk   Vaping Use    Vaping Use: Never used   Substance and Sexual Activity Alcohol use: Yes     Comment: social    Drug use: No    Sexual activity: Yes     Partners: Male   Other Topics Concern    Not on file   Social History Narrative    Lives home with  and daughter. She has an inherited Tarantula a 36year old tarantula. Social Determinants of Health     Financial Resource Strain: Low Risk     Difficulty of Paying Living Expenses: Not hard at all   Food Insecurity: No Food Insecurity    Worried About Running Out of Food in the Last Year: Never true    Ran Out of Food in the Last Year: Never true   Transportation Needs: Not on file   Physical Activity: Not on file   Stress: Not on file   Social Connections: Not on file   Intimate Partner Violence: Not on file   Housing Stability: Not on file     No Known Allergies  Prior to Admission medications    Medication Sig Start Date End Date Taking? Authorizing Provider   diclofenac (VOLTAREN) 50 MG EC tablet Take 1 tablet by mouth 2 times daily as needed for Pain 7/12/22   Pieter Gonzáles MD   ibuprofen (ADVIL;MOTRIN) 800 MG tablet TAKE 1 TABLET BY MOUTH EVERY 8 HOURS AS NEEDED FOR PAIN 7/20/20   Carrington Prom, APRN - CNP       Review of Systems  12 point review of systems was reviewed and negative except for that listed in HPI    Physical Exam  Vitals:    08/25/22 0039 08/25/22 0300 08/25/22 0613 08/25/22 0630   BP: 109/69 115/66 121/71    Pulse: 89 89 81    Resp: 20 18 16    Temp: 97.8 °F (36.6 °C)  98.5 °F (36.9 °C)    TempSrc: Oral  Oral    SpO2: 100% 98% 95%    Weight: 228 lb (103.4 kg)   184 lb 15.5 oz (83.9 kg)   Height: 5' 8\" (1.727 m)  5' 8\" (1.727 m)          Intake/Output Summary (Last 24 hours) at 8/25/2022 0756  Last data filed at 8/25/2022 0203  Gross per 24 hour   Intake 1000 ml   Output --   Net 1000 ml       Body mass index is 28.12 kg/m². General Appearance: alert, well appearing, and in no distress   HEENT: NCAT, PERRLA, Conjunctiva non injected, no scleral icterus.   External ears and nares normal bilaterally. Mucous membranes pink and moist.   Neck: Neck is symmetrical. Trachea appears midline. Lung: Clear to auscultation bilaterally, normal rate and effort   Cardiac: Regular rate and rhythm, S1S2 present, without murmur   Abdomen: Soft, nondistended. Focal RLQ tenderness without generalized peritonitis. C section scar. Neuro: No gross motor or sensory deficits. Skin: No open wounds or rashes. MSK: normal ROM, no edema  Psych: normal insight, judgment    Labs  Recent Labs     08/25/22  0100   WBC 14.9*   HGB 14.2   HCT 42.5        Recent Labs     08/25/22 0100   *   K 3.9   CL 97*   CO2 22   BUN 10   GFRAA >60     Recent Labs     08/25/22 0100   AST 14*   ALT 13     No results for input(s): UOSM in the last 72 hours. Invalid input(s): [facility], [facility], [facility], [facility], [facility], [facility]    Imaging  CT ABDOMEN PELVIS W IV CONTRAST Additional Contrast? None   Final Result   1. Acute appendicitis with appendiceal dilation, as well as a small   appendicolith seen at the base of the appendix. Periappendiceal inflammatory   changes and reactive fluid noted in the pelvis. 2. Acute ileitis secondary to adjacent appendicitis. 3. Fatty liver infiltration. 4. Bilateral adrenal masses, 3.2 cm on the right. These do not meet criteria   for a benign adenoma, though could be lipid poor adenomas. Recommend   dedicated chemical shift MRI or CT adrenal mass protocol exam for further   evaluation                Assessment  Lisa Chi is a 47 y.o. female with acute appendicitis    Plan    1. Acute appendicitis  Proceed to OR for laparoscopic appendectomy, possible open  The risks, benefits and alternatives to the planned procedure were discussed. Patient expressed an understanding and is willing to proceed. Pre op antibiotics, IVF, SCDs  Home later today vs tomorrow pending surgical findings and post op course.     Electronically signed by SANTOS Lambert CNP on 8/25/22 at 7:56

## 2022-08-25 NOTE — ED PROVIDER NOTES
[address] [facility]      Pt Name: Marianne Bath Day  MRN: [id number]  Bhavik 1970  Date of evaluation: 8/25/2022  Provider: Julio C Ortiz, [address] [place] [address]  Chief Complaint   Patient presents with    Abdominal Pain     Pt c/o mid upper to lower abd pain with N/V/D that started at 12 yesterday. Nausea & Vomiting    Diarrhea       I wore personal protective equipment when I was in the room the entire time. This includes gloves, N95 mask, face shield, and a glove over my stethoscope for protection. HPI  Lisa VALDEZ Day is a 47 y.o. female who presents with nausea vomiting and diarrhea with diffuse abdominal pain this been present since 3 AM.  She states that increased over the past hour. She is only had 2 crackers the for the past 24 hours. She has had a fever of 100.2. She states her temperature is normally 97.7. She states this woke her up from her sleep. Her last bowel was today and was normal.  She denies use of marijuana and occasionally drinks alcohol. She states her pain is constant but waxes and wanes. She was concerned she might have food poisoning but they check the dates on the food she ate and it was not outdated. She been urinating without difficulty. She denies gastrointestinal or genitourinary bleeding. She denies a previous history. Her only surgeries been C-sections x1. She denies history of appendectomy cholecystectomy herniorrhaphies. She denies any radiation of her pain. ? REVIEW OF SYSTEMS  All systems negative except as noted in the HPI. Reviewed Nurses' notes and concur. No LMP recorded.     PAST MEDICAL HISTORY  Past Medical History:   Diagnosis Date    Broken tooth 05/01/2018    upper right    Capsulitis of shoulder, left     Dental bridge present     Dental crowns present     Migraine without status migrainosus, not intractable 6/24/2016    Sleep apnea 06/24/2016    uses CPAP sporatically FAMILY HISTORY  Family History   Problem Relation Age of Onset    Other Sister         NETO    High Blood Pressure Father     Other Father         NETO    COPD Father     Heart Disease Father        SOCIAL HISTORY   reports that she quit smoking about 2 years ago. Her smoking use included cigarettes. She smoked an average of .25 packs per day. She uses smokeless tobacco. She reports current alcohol use. She reports that she does not use drugs. SURGICAL HISTORY  Past Surgical History:   Procedure Laterality Date     SECTION      x1    OTHER SURGICAL HISTORY Left 2018    Left Shoulder Manipulation/ Steroid Injection       CURRENT MEDICATIONS  Current Outpatient Rx   Medication Sig Dispense Refill    diclofenac (VOLTAREN) 50 MG EC tablet Take 1 tablet by mouth 2 times daily as needed for Pain 60 tablet 1    ibuprofen (ADVIL;MOTRIN) 800 MG tablet TAKE 1 TABLET BY MOUTH EVERY 8 HOURS AS NEEDED FOR PAIN 90 tablet 0       ALLERGIES  No Known Allergies      PHYSICAL EXAM  VITAL SIGNS: /69   Pulse 89   Temp 97.8 °F (36.6 °C) (Oral)   Resp 20   Ht 5' 8\" (1.727 m)   Wt 228 lb (103.4 kg)   SpO2 100%   BMI 34.67 kg/m²    Constitutional: Well-developed, well-nourished, appears in moderate pain but otherwise normal, eyes are closed, nontoxic, activity: Sitting on the chair, appears in mild pain. She appears nontoxic  HEENT: Normocephalic, Atraumatic, Bilateral ears are normal, Oropharynx moist, No oral exudates, Nose normal.  Eyes: PERRLA, EOMI, Conjunctiva normal, No discharge. No scleral icterus. Neck: Normal range of motion, No tenderness, Supple,  Lymphatic: No lymphadenopathy noted. Cardiovascular: Normal heart rate, Normal rhythm, no murmurs, no gallops, no rubs.   Thorax & Lungs: Normal breath sounds, No respiratory distress, No wheezing,  Abdomen: Soft, moderate left lower quadrant and left upper quadrant tenderness with mild right upper quadrant and right lower quadrant tender with mild Recommend   dedicated chemical shift MRI or CT adrenal mass protocol exam for further   evaluation              COURSE & MEDICAL DECISION MAKING  Pertinent Labs & Imaging studies reviewed. (See chart for details)    Vitals:    08/25/22 0039   BP: 109/69   Pulse: 89   Resp: 20   Temp: 97.8 °F (36.6 °C)   TempSrc: Oral   SpO2: 100%   Weight: 228 lb (103.4 kg)   Height: 5' 8\" (1.727 m)       Medications   dextrose 5 % and 0.45 % sodium chloride infusion (has no administration in time range)   ampicillin-sulbactam (UNASYN) 3000 mg in 100 mL NS IVPB minibag (has no administration in time range)   0.9 % sodium chloride bolus (0 mLs IntraVENous Stopped 8/25/22 0203)   ondansetron (ZOFRAN) injection 8 mg (8 mg IntraVENous Given 8/25/22 0109)   HYDROmorphone (DILAUDID) injection 1 mg (1 mg IntraVENous Given 8/25/22 0109)   iopamidol (ISOVUE-370) 76 % injection 75 mL (75 mLs IntraVENous Given 8/25/22 0159)       New Prescriptions    No medications on file       SEP-1 CORE MEASURE DATA  Exclusion criteria: the patient is NOT to be included for sepsis due to:  SIRS criteria are not met    Patient remained stable in the ED. CT scan revealed appendicitis. White count was 14.9. Lipase was normal.  Chemistries are normal.  Therefore, patient was admitted to the hospital for further evaluation and treatment. General surgery, Dr. Anyi Lacy, was notified at 200. They will admit the patient to the hospital for further evaluation and treatment. The patient's blood pressure was not found to be elevated according to CMS/Medicare and the Affordable Care Act/ObamaCare criteria. See discharge instructions for specific medications, discharge information, and treatments. They were verbally instructed to return to emergency if any problems. (This chart has been completed using [address].  Although attempts have been made to ensure accuracy, words and/or phrases may not be transcribed as intended.)    Patient refused pain medicines at the time of her exam.    IMPRESSION(S):  1. Acute appendicitis with generalized peritonitis without gangrene, unspecified whether abscess present, unspecified whether perforation present        ? Recheck Times: 0300  Critical Care Time: 35 minutes     Diagnostic considerations include but are not limited to:   Abdominal Aortic Aneurysm, Acute Coronary Syndrome, Ischemic Bowel, Bowel Obstruction (including Gastric Outlet Obstruction), cyclical vomiting syndrome, PUD, GERD, Acute Cholecystitis, Pancreatitis, Hepatitis, Colitis, SMA Syndrome, Mesenteric Steal Syndrome, Splanchnic Vein Thrombosis, cyclical vomiting syndrome, other        Irvin Boss DO  08/25/22 7419

## 2022-08-25 NOTE — ED NOTES
.report given to [facility]. No further questions at this time.      Kyle Espino RN  08/25/22 8751

## 2022-08-25 NOTE — PROGRESS NOTES
Pt is alert and oriented, states that pain is tolerable at this time.  Report called to RN on floor, will transfer to room 1092

## 2022-08-26 PROCEDURE — 6360000002 HC RX W HCPCS: Performed by: SURGERY

## 2022-08-26 PROCEDURE — APPNB30 APP NON BILLABLE TIME 0-30 MINS: Performed by: NURSE PRACTITIONER

## 2022-08-26 PROCEDURE — 94761 N-INVAS EAR/PLS OXIMETRY MLT: CPT

## 2022-08-26 PROCEDURE — G0378 HOSPITAL OBSERVATION PER HR: HCPCS

## 2022-08-26 PROCEDURE — 96375 TX/PRO/DX INJ NEW DRUG ADDON: CPT

## 2022-08-26 PROCEDURE — 99024 POSTOP FOLLOW-UP VISIT: CPT | Performed by: SURGERY

## 2022-08-26 PROCEDURE — 2580000003 HC RX 258: Performed by: SURGERY

## 2022-08-26 PROCEDURE — 6370000000 HC RX 637 (ALT 250 FOR IP): Performed by: SURGERY

## 2022-08-26 PROCEDURE — 96376 TX/PRO/DX INJ SAME DRUG ADON: CPT

## 2022-08-26 PROCEDURE — 2500000003 HC RX 250 WO HCPCS: Performed by: SURGERY

## 2022-08-26 PROCEDURE — 2700000000 HC OXYGEN THERAPY PER DAY

## 2022-08-26 PROCEDURE — 96366 THER/PROPH/DIAG IV INF ADDON: CPT

## 2022-08-26 PROCEDURE — 96372 THER/PROPH/DIAG INJ SC/IM: CPT

## 2022-08-26 PROCEDURE — 96365 THER/PROPH/DIAG IV INF INIT: CPT

## 2022-08-26 RX ORDER — AMOXICILLIN AND CLAVULANATE POTASSIUM 875; 125 MG/1; MG/1
1 TABLET, FILM COATED ORAL 2 TIMES DAILY
Qty: 12 TABLET | Refills: 0 | Status: SHIPPED | OUTPATIENT
Start: 2022-08-26 | End: 2022-09-01

## 2022-08-26 RX ORDER — ACETAMINOPHEN 500 MG
500 TABLET ORAL EVERY 6 HOURS PRN
COMMUNITY
Start: 2022-08-26 | End: 2022-09-02

## 2022-08-26 RX ORDER — POLYETHYLENE GLYCOL 3350 17 G/17G
17 POWDER ORAL DAILY PRN
COMMUNITY
Start: 2022-08-26 | End: 2022-09-25

## 2022-08-26 RX ORDER — OXYCODONE HYDROCHLORIDE 5 MG/1
5 TABLET ORAL EVERY 6 HOURS PRN
Qty: 20 TABLET | Refills: 0 | Status: SHIPPED | OUTPATIENT
Start: 2022-08-26 | End: 2022-08-31

## 2022-08-26 RX ADMIN — SODIUM CHLORIDE, POTASSIUM CHLORIDE, SODIUM LACTATE AND CALCIUM CHLORIDE: 600; 310; 30; 20 INJECTION, SOLUTION INTRAVENOUS at 21:52

## 2022-08-26 RX ADMIN — HYDROMORPHONE HYDROCHLORIDE 0.5 MG: 1 INJECTION, SOLUTION INTRAMUSCULAR; INTRAVENOUS; SUBCUTANEOUS at 10:16

## 2022-08-26 RX ADMIN — METRONIDAZOLE 500 MG: 500 INJECTION, SOLUTION INTRAVENOUS at 16:30

## 2022-08-26 RX ADMIN — ACETAMINOPHEN 650 MG: 325 TABLET, FILM COATED ORAL at 16:34

## 2022-08-26 RX ADMIN — ENOXAPARIN SODIUM 30 MG: 100 INJECTION SUBCUTANEOUS at 20:40

## 2022-08-26 RX ADMIN — CIPROFLOXACIN 400 MG: 2 INJECTION, SOLUTION INTRAVENOUS at 20:45

## 2022-08-26 RX ADMIN — SODIUM CHLORIDE, PRESERVATIVE FREE 10 ML: 5 INJECTION INTRAVENOUS at 20:50

## 2022-08-26 RX ADMIN — METRONIDAZOLE 500 MG: 500 INJECTION, SOLUTION INTRAVENOUS at 02:17

## 2022-08-26 RX ADMIN — HYDROMORPHONE HYDROCHLORIDE 1 MG: 1 INJECTION, SOLUTION INTRAMUSCULAR; INTRAVENOUS; SUBCUTANEOUS at 20:40

## 2022-08-26 RX ADMIN — HYDROMORPHONE HYDROCHLORIDE 1 MG: 1 INJECTION, SOLUTION INTRAMUSCULAR; INTRAVENOUS; SUBCUTANEOUS at 16:30

## 2022-08-26 RX ADMIN — ENOXAPARIN SODIUM 30 MG: 100 INJECTION SUBCUTANEOUS at 10:16

## 2022-08-26 RX ADMIN — CIPROFLOXACIN 400 MG: 2 INJECTION, SOLUTION INTRAVENOUS at 11:43

## 2022-08-26 RX ADMIN — SODIUM CHLORIDE, POTASSIUM CHLORIDE, SODIUM LACTATE AND CALCIUM CHLORIDE: 600; 310; 30; 20 INJECTION, SOLUTION INTRAVENOUS at 10:16

## 2022-08-26 RX ADMIN — METRONIDAZOLE 500 MG: 500 INJECTION, SOLUTION INTRAVENOUS at 10:18

## 2022-08-26 RX ADMIN — HYDROMORPHONE HYDROCHLORIDE 1 MG: 1 INJECTION, SOLUTION INTRAMUSCULAR; INTRAVENOUS; SUBCUTANEOUS at 13:45

## 2022-08-26 RX ADMIN — PANTOPRAZOLE SODIUM 40 MG: 40 TABLET, DELAYED RELEASE ORAL at 06:31

## 2022-08-26 ASSESSMENT — PAIN DESCRIPTION - DESCRIPTORS
DESCRIPTORS: ACHING
DESCRIPTORS: ACHING

## 2022-08-26 ASSESSMENT — PAIN SCALES - GENERAL
PAINLEVEL_OUTOF10: 7
PAINLEVEL_OUTOF10: 4
PAINLEVEL_OUTOF10: 3
PAINLEVEL_OUTOF10: 7

## 2022-08-26 ASSESSMENT — PAIN SCALES - WONG BAKER: WONGBAKER_NUMERICALRESPONSE: 2

## 2022-08-26 ASSESSMENT — PAIN DESCRIPTION - LOCATION
LOCATION: HEAD
LOCATION: ABDOMEN

## 2022-08-26 ASSESSMENT — PAIN - FUNCTIONAL ASSESSMENT: PAIN_FUNCTIONAL_ASSESSMENT: ACTIVITIES ARE NOT PREVENTED

## 2022-08-26 ASSESSMENT — PAIN DESCRIPTION - ORIENTATION: ORIENTATION: RIGHT

## 2022-08-26 NOTE — DISCHARGE INSTR - COC
Continuity of Care Form    Patient Name: Alejandra Chi   :  1970  MRN:  [id number]    Admit date:  2022  Discharge date:  ***    Code Status Order: Full Code   Advance Directives:   Advance Care Flowsheet Documentation       Date/Time Healthcare Directive Type of Healthcare Directive Copy in [address] Agent's Name Healthcare Agent's Phone Number    22 1043 No, patient does not have an advance directive for healthcare treatment -- -- -- -- --            Admitting Physician:  Royann Epley, MD  PCP: Analisa Benedict MD    Discharging Nurse: [facility] Unit/Room#: D9A-1644/7887-43  Discharging Unit Phone Number: ***    Emergency Contact:   Extended Emergency Contact Information  Primary Emergency Contact: Kaveh Chi  Address: [address] [place] [address] Phone: [phone]  Mobile Phone: [phone]  Relation: Spouse  Secondary Emergency Contact: Jolene Chi  Address: [address] Phone: [phone]  Relation: Child    Past Surgical History:  Past Surgical History:   Procedure Laterality Date     SECTION      x1    LAPAROSCOPIC APPENDECTOMY N/A 2022    LAPAROSCOPIC APPENDECTOMY performed by Royann Epley, MD at [facility] 2018    Left Shoulder Manipulation/ Steroid Injection       Immunization History: There is no immunization history on file for this patient.     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    Obstructive sleep apnea G47.33    Migraine without status migrainosus, not intractable G43.909    Adhesive capsulitis of left shoulder M75.02    Acute appendicitis K35.80    Purulent peritonitis (HCC) K65.0       Isolation/Infection:   Isolation            No Isolation          Patient Infection Status       None to display Nurse Assessment:  Last Vital Signs: /71   Pulse 74   Temp 98.2 °F (36.8 °C) (Oral)   Resp 18   Ht 5' 8\" (1.727 m)   Wt 185 lb 10 oz (84.2 kg)   SpO2 97%   BMI 28.22 kg/m²     Last documented pain score (0-10 scale): Pain Level: 7  Last Weight:   Wt Readings from Last 1 Encounters:   08/26/22 185 lb 10 oz (84.2 kg)     Mental Status:  {IP PT MENTAL STATUS:20030}    IV Access:  { JOVITA IV ACCESS:178942377}    Nursing Mobility/ADLs:  Walking   {CHP DME QZSR:252844395}  Transfer  {CHP DME JHXE:373311224}  Bathing  {CHP DME WUCC:847815271}  Dressing  {CHP DME NTGW:172090351}  Toileting  {CHP DME DLHX:837595049}  Feeding  {CHP DME LGIQ:583853568}  Med Admin  {P DME TJKA:835354490}  Med Delivery   { JOVITA MED Delivery:418944545}    Wound Care Documentation and Therapy:  Incision 08/25/22 Abdomen Anterior (Active)   Dressing Status Clean;Dry; Intact 08/26/22 1030   Incision Cleansed Cleansed with saline 08/25/22 1236   Dressing/Treatment Surgical glue 08/25/22 1408   Closure Surgical glue 08/26/22 1030   Margins Approximated 08/26/22 1030   Drainage Amount None 08/26/22 1030   Number of days: 1        Elimination:  Continence: Bowel: {YES / NB:33253}  Bladder: {YES / TP:71299}  Urinary Catheter: {Urinary Catheter:247172880}   Colostomy/Ileostomy/Ileal Conduit: {YES / CM:16984}       Date of Last BM: ***    Intake/Output Summary (Last 24 hours) at 8/26/2022 1518  Last data filed at 8/26/2022 1431  Gross per 24 hour   Intake 2714.56 ml   Output --   Net 2714.56 ml     I/O last 3 completed shifts:   In: 4034.6 [I.V.:2934.6; IV Piggyback:1100]  Out: -     Safety Concerns:     [address] JOVITA Safety Concerns:719320543}    Impairments/Disabilities:      [address] HUSSEIN Impairments/Disabilities:951255879}    Nutrition Therapy:  Current Nutrition Therapy:   [address] HUSSEIN Diet List:982153102}    Routes of Feeding: {CHP DME Other Feedings:545356896}  Liquids: {Slp liquid thickness:47380}  Daily Fluid Restriction: {CHP DME Yes amt

## 2022-08-26 NOTE — CARE COORDINATION
INITIAL CASE MANAGEMENT ASSESSMENT     Reviewed chart, met with patient to assess possible discharge needs. Explained Case Management role/services. SW met with patient alone at bedside today. Living Situation: Patient resides in a single family home with her , daughter Jolene and one dog. There are five stairs leading up to the front door. Prior to medical admission she informs that she had no trouble getting in and out of the property. ADLs: Prior to medical admission patient reports that she was independent. She stated that she doesn't anticipate any needs at discharge. DME: Prior to medical admission patient reports that she used no durable medical equipment. She is currently on IVAB at bedside and we will continue to monitor for needs until discharge. PT/OT Recs: Not ordered. Active Services: Prior to medical admission patient reports that she had no active services in place. She stated that she doesn't anticipate any needs at discharge. Transportation: Prior to medical admission patient reports that she was an active . She stated that her  will likely transport her home at discharge. Medications: Patient receives medications from [facility], [facility] and [facility] at the [address]. At this time there are no issues with access or affordability. PCP: Robin Kirby [phone] (phone) and [phone] (fax number) Patient reports that her last appointment with this provider was over two months ago. HD/PD: N/A     PLAN/COMMENTS:   1) General surgery clearance. We will be watching for advanced diet toleration and pain management post procedure. 2) Work note needed. 3) Discharge to home with family      SW provided contact information for patient or family to call with any questions. SW will follow and assist as needed. Respectfully submitted,     Suze Edmondies MSW, LISW-S  [address] [facility] Worker  [phone]    Electronically signed by CASANDRA Andino on 8/26/2022 at 10:40 AM

## 2022-08-26 NOTE — PROGRESS NOTES
[facility] General Surgery                                   Daily Progress Note                                                         Pt Name: Alex Chi  Medical Record Number: [id number]  Date of Birth 1970   Today's Date: 8/26/2022      ASSESSMENT/PLAN  Acute gangrenous appendicitis with purulent peritonitis  -purulent ascites in pelvis  -POD #1 lap appy  -continue IV antibiotics  -IS, wean O2.  -supportive care, pain control  -denies flatus or BM  -diet as tolerated  -home possibly later today but more likely tomorrow on 6 more days of antibiotics            SUBJECTIVE  Lisa is resting in bed, no acute distress. Left sided abd pain as expected and pressure in her pelvis. OBJECTIVE  VITALS:  height is 5' 8\" (1.727 m) and weight is 185 lb 10 oz (84.2 kg). Her oral temperature is 98.2 °F (36.8 °C). Her blood pressure is 113/71 and her pulse is 74. Her respiration is 18 and oxygen saturation is 97%. INTAKE/OUTPUT:    Intake/Output Summary (Last 24 hours) at 8/26/2022 1151  Last data filed at 8/26/2022 1028  Gross per 24 hour   Intake 3554.56 ml   Output --   Net 3554.56 ml     GENERAL: alert, cooperative, no distress  LUNGS: clear to ausculation, without wheezes, rales or rhonci  HEART: normal rate and regular rhythm  ABDOMEN: Soft, appropriately tender, mild distention. Incisions clean dry and intact. EXTREMITY: no cyanosis, clubbing or edema    I/O last 3 completed shifts:   In: 4034.6 [I.V.:2934.6; IV Piggyback:1100]  Out: -       LABS  Recent Labs     08/25/22  0100 08/25/22  0221   WBC 14.9*  --    HGB 14.2  --    HCT 42.5  --      --    *  --    K 3.9  --    CL 97*  --    CO2 22  --    BUN 10  --    CREATININE 1.0  --    CALCIUM 9.3  --    AST 14*  --    ALT 13  --    BILITOT 1.1*  --    NITRU  --  Negative   COLORU  --  Yellow   BACTERIA  --  Rare*       EDUCATION  Patient educated about Disease Process, Medications, Smoking Cessation, Oxygenation, Incentive Spirometry and Deep Breath and Cough, Diabetes, Hyperlipidemia, Smoking Cessation, Nutrition, Exercise and Hypertension    Electronically signed by SANTOS Acosta CNP on 8/26/2022 at 11:51 AM      [facility] and Vascular Surgery   [phone] Office  [phone] Fax     Agree with above note. The patient was personally seen and examined. Lisa A Day is doing better. Pain present but controlled. Minimal po intake. No flatus or BM. Feels bloated. NAD  Abd soft, appropriately tender, minimally distended, incisions c/d/I, no erythema or induration    A/P: 48 yo female with gangrenous appendicitis with purulent peritonitis s/p lap appendectomy POD #1    Continue IV abx  Continue regular diet. Advised to go slowly. Await GI function  Ambulate/OOB. Abd binder per patient request  Hopefully home over the next day or two once bowel function returns.   Will need total of 7 day course of antibiotics    Sophia Mary MD

## 2022-08-27 LAB
ANION GAP SERPL CALCULATED.3IONS-SCNC: 10 MMOL/L (ref 3–16)
BASOPHILS ABSOLUTE: 0 K/UL (ref 0–0.2)
BASOPHILS RELATIVE PERCENT: 0.4 %
BUN BLDV-MCNC: 8 MG/DL (ref 7–20)
CALCIUM SERPL-MCNC: 8.1 MG/DL (ref 8.3–10.6)
CHLORIDE BLD-SCNC: 100 MMOL/L (ref 99–110)
CO2: 24 MMOL/L (ref 21–32)
CREAT SERPL-MCNC: 0.7 MG/DL (ref 0.6–1.1)
EOSINOPHILS ABSOLUTE: 0.1 K/UL (ref 0–0.6)
EOSINOPHILS RELATIVE PERCENT: 2.1 %
GFR AFRICAN AMERICAN: >60
GFR NON-AFRICAN AMERICAN: >60
GLUCOSE BLD-MCNC: 98 MG/DL (ref 70–99)
HCT VFR BLD CALC: 32.3 % (ref 36–48)
HEMOGLOBIN: 10.8 G/DL (ref 12–16)
LYMPHOCYTES ABSOLUTE: 0.9 K/UL (ref 1–5.1)
LYMPHOCYTES RELATIVE PERCENT: 12.5 %
MCH RBC QN AUTO: 29.2 PG (ref 26–34)
MCHC RBC AUTO-ENTMCNC: 33.3 G/DL (ref 31–36)
MCV RBC AUTO: 87.7 FL (ref 80–100)
MONOCYTES ABSOLUTE: 0.6 K/UL (ref 0–1.3)
MONOCYTES RELATIVE PERCENT: 7.9 %
NEUTROPHILS ABSOLUTE: 5.5 K/UL (ref 1.7–7.7)
NEUTROPHILS RELATIVE PERCENT: 77.1 %
PDW BLD-RTO: 14.3 % (ref 12.4–15.4)
PLATELET # BLD: 178 K/UL (ref 135–450)
PMV BLD AUTO: 9.1 FL (ref 5–10.5)
POTASSIUM SERPL-SCNC: 3.6 MMOL/L (ref 3.5–5.1)
RBC # BLD: 3.69 M/UL (ref 4–5.2)
SODIUM BLD-SCNC: 134 MMOL/L (ref 136–145)
WBC # BLD: 7.1 K/UL (ref 4–11)

## 2022-08-27 PROCEDURE — 6370000000 HC RX 637 (ALT 250 FOR IP): Performed by: SURGERY

## 2022-08-27 PROCEDURE — 96372 THER/PROPH/DIAG INJ SC/IM: CPT

## 2022-08-27 PROCEDURE — 2500000003 HC RX 250 WO HCPCS: Performed by: SURGERY

## 2022-08-27 PROCEDURE — 6360000002 HC RX W HCPCS: Performed by: SURGERY

## 2022-08-27 PROCEDURE — 96375 TX/PRO/DX INJ NEW DRUG ADDON: CPT

## 2022-08-27 PROCEDURE — 2580000003 HC RX 258: Performed by: SURGERY

## 2022-08-27 PROCEDURE — G0378 HOSPITAL OBSERVATION PER HR: HCPCS

## 2022-08-27 PROCEDURE — 36415 COLL VENOUS BLD VENIPUNCTURE: CPT

## 2022-08-27 PROCEDURE — 96376 TX/PRO/DX INJ SAME DRUG ADON: CPT

## 2022-08-27 PROCEDURE — 99024 POSTOP FOLLOW-UP VISIT: CPT | Performed by: SURGERY

## 2022-08-27 PROCEDURE — 96361 HYDRATE IV INFUSION ADD-ON: CPT

## 2022-08-27 PROCEDURE — APPNB15 APP NON BILLABLE TIME 0-15 MINS: Performed by: PHYSICIAN ASSISTANT

## 2022-08-27 PROCEDURE — 96366 THER/PROPH/DIAG IV INF ADDON: CPT

## 2022-08-27 PROCEDURE — APPSS15 APP SPLIT SHARED TIME 0-15 MINUTES: Performed by: PHYSICIAN ASSISTANT

## 2022-08-27 PROCEDURE — 85025 COMPLETE CBC W/AUTO DIFF WBC: CPT

## 2022-08-27 PROCEDURE — 80048 BASIC METABOLIC PNL TOTAL CA: CPT

## 2022-08-27 PROCEDURE — 99024 POSTOP FOLLOW-UP VISIT: CPT | Performed by: PHYSICIAN ASSISTANT

## 2022-08-27 RX ADMIN — ONDANSETRON 4 MG: 2 INJECTION INTRAMUSCULAR; INTRAVENOUS at 13:50

## 2022-08-27 RX ADMIN — HYDROMORPHONE HYDROCHLORIDE 1 MG: 1 INJECTION, SOLUTION INTRAMUSCULAR; INTRAVENOUS; SUBCUTANEOUS at 22:08

## 2022-08-27 RX ADMIN — CIPROFLOXACIN 400 MG: 2 INJECTION, SOLUTION INTRAVENOUS at 21:57

## 2022-08-27 RX ADMIN — METRONIDAZOLE 500 MG: 500 INJECTION, SOLUTION INTRAVENOUS at 17:58

## 2022-08-27 RX ADMIN — SODIUM CHLORIDE, PRESERVATIVE FREE 10 ML: 5 INJECTION INTRAVENOUS at 22:14

## 2022-08-27 RX ADMIN — PANTOPRAZOLE SODIUM 40 MG: 40 TABLET, DELAYED RELEASE ORAL at 06:04

## 2022-08-27 RX ADMIN — SODIUM CHLORIDE, POTASSIUM CHLORIDE, SODIUM LACTATE AND CALCIUM CHLORIDE: 600; 310; 30; 20 INJECTION, SOLUTION INTRAVENOUS at 14:59

## 2022-08-27 RX ADMIN — METRONIDAZOLE 500 MG: 500 INJECTION, SOLUTION INTRAVENOUS at 02:19

## 2022-08-27 RX ADMIN — METRONIDAZOLE 500 MG: 500 INJECTION, SOLUTION INTRAVENOUS at 10:21

## 2022-08-27 RX ADMIN — ENOXAPARIN SODIUM 30 MG: 100 INJECTION SUBCUTANEOUS at 08:20

## 2022-08-27 RX ADMIN — CIPROFLOXACIN 400 MG: 2 INJECTION, SOLUTION INTRAVENOUS at 08:21

## 2022-08-27 RX ADMIN — ACETAMINOPHEN 650 MG: 325 TABLET, FILM COATED ORAL at 08:20

## 2022-08-27 RX ADMIN — ENOXAPARIN SODIUM 30 MG: 100 INJECTION SUBCUTANEOUS at 19:58

## 2022-08-27 ASSESSMENT — PAIN SCALES - GENERAL
PAINLEVEL_OUTOF10: 7
PAINLEVEL_OUTOF10: 3
PAINLEVEL_OUTOF10: 5
PAINLEVEL_OUTOF10: 3
PAINLEVEL_OUTOF10: 0

## 2022-08-27 ASSESSMENT — PAIN SCALES - WONG BAKER: WONGBAKER_NUMERICALRESPONSE: 0

## 2022-08-27 ASSESSMENT — PAIN DESCRIPTION - LOCATION
LOCATION: HEAD
LOCATION: ABDOMEN

## 2022-08-27 ASSESSMENT — PAIN DESCRIPTION - DESCRIPTORS
DESCRIPTORS: ACHING
DESCRIPTORS: ACHING

## 2022-08-27 ASSESSMENT — PAIN DESCRIPTION - ORIENTATION: ORIENTATION: LEFT

## 2022-08-27 ASSESSMENT — PAIN - FUNCTIONAL ASSESSMENT: PAIN_FUNCTIONAL_ASSESSMENT: ACTIVITIES ARE NOT PREVENTED

## 2022-08-27 NOTE — PROGRESS NOTES
[facility] General Surgery                                   Daily Progress Note                                                         Pt Name: Ximena Ventura Day  Medical Record Number: [id number]  Date of Birth 1970   Today's Date: 8/27/2022      ASSESSMENT/PLAN  Acute gangrenous appendicitis with purulent peritonitis  -purulent ascites in pelvis  -POD #2 lap appy  -continue IV antibiotics  -IS, wean O2.  -supportive care, pain control  -denies flatus or BM  -Some nausea and belching  -diet as tolerated  -home possibly later today (if bowel function improves, diet tolerated, ambulating well, nausea resolves, and pain controlled), but more likely tomorrow on 6 more days of antibiotics. Christine Sotelo is resting in bed, no acute distress. Left sided abd pain as expected and pressure in her pelvis. OBJECTIVE  VITALS:  height is 5' 8\" (1.727 m) and weight is 235 lb 3.7 oz (106.7 kg). Her oral temperature is 99.4 °F (37.4 °C). Her blood pressure is 127/78 and her pulse is 92. Her respiration is 16 and oxygen saturation is 95%. INTAKE/OUTPUT:    Intake/Output Summary (Last 24 hours) at 8/27/2022 1152  Last data filed at 8/27/2022 0615  Gross per 24 hour   Intake 2799.88 ml   Output --   Net 2799.88 ml       GENERAL: alert, cooperative, no distress  LUNGS: clear to ausculation, without wheezes, rales or rhonci  HEART: normal rate and regular rhythm  ABDOMEN: Soft, appropriately tender, mild distention. Incisions clean dry and intact. EXTREMITY: no cyanosis, clubbing or edema    I/O last 3 completed shifts: In: 5154.4 [P.O.:1760;  I.V.:3394.4]  Out: -       LABS  Recent Labs     08/25/22  0100 08/25/22  0221 08/27/22  0544   WBC 14.9*  --  7.1   HGB 14.2  --  10.8*   HCT 42.5  --  32.3*     --  178   *  --  134*   K 3.9  --  3.6   CL 97*  --  100   CO2 22  --  24   BUN 10  --  8   CREATININE 1.0  --  0.7   CALCIUM 9.3  --  8.1*   AST 14* --   --    ALT 13  --   --    BILITOT 1.1*  --   --    NITRU  --  Negative  --    COLORU  --  Yellow  --    BACTERIA  --  Rare*  --          EDUCATION  Patient educated about Disease Process, Medications, Smoking Cessation, Oxygenation, Incentive Spirometry and Deep Breath and Cough, Diabetes, Hyperlipidemia, Smoking Cessation, Nutrition, Exercise and Hypertension    Electronically signed by Shanique Hawley PA-C on 8/27/2022 at 11:52 AM      [facility]   [phone] Office  [phone] Fax       Surgery Staff  I have examined this patient and read and agree with the note by Seema Sawyer PA-C from today. C/o bloating and nausea today.   Passing flatus  Abdomen mildly distended    Will monitor this AM.  If improved then D/C later today vs tomorrow  Encouraged OOB      Electronically signed by Urmila Hernandez MD on 8/27/2022 at 12:31 PM

## 2022-08-27 NOTE — PLAN OF CARE
Problem: Discharge Planning  Goal: Discharge to home or other facility with appropriate resources  8/27/2022 1008 by Vivian Goff RN  Outcome: Progressing  Flowsheets (Taken 8/27/2022 0005 by Shelly Villarreal RN)  Discharge to home or other facility with appropriate resources: Identify barriers to discharge with patient and caregiver     Problem: Pain  Goal: Verbalizes/displays adequate comfort level or baseline comfort level  8/27/2022 1008 by Vivian Goff RN  Outcome: Progressing     Problem: ABCDS Injury Assessment  Goal: Absence of physical injury  8/27/2022 1008 by Vivian Goff RN  Outcome: Progressing     Problem: Safety - Adult  Goal: Free from fall injury  8/27/2022 1008 by Vivian Goff RN  Outcome: Progressing    Problem: Skin/Tissue Integrity - Adult  Goal: Incisions, wounds, or drain sites healing without S/S of infection  Outcome: Progressing     Problem: Gastrointestinal - Adult  Goal: Minimal or absence of nausea and vomiting  Outcome: Progressing     Problem: Gastrointestinal - Adult  Goal: Maintains or returns to baseline bowel function  Outcome: Progressing     Problem: Infection - Adult  Goal: Absence of infection at discharge  Outcome: Progressing     Problem: Gastrointestinal - Adult  Goal: Maintains adequate nutritional intake  Outcome: Progressing - - -

## 2022-08-27 NOTE — CARE COORDINATION
Discharge order noted. Chart reviewed. Plan is for patient to return home with family. No additional discharge needs identified at this time.      Electronically signed by David Gaston RN MSN on 8/27/2022 at 11:58 AM

## 2022-08-27 NOTE — PLAN OF CARE
Problem: Discharge Planning  Goal: Discharge to home or other facility with appropriate resources  Outcome: Progressing  Flowsheets (Taken 8/26/2022 1030 by Jerica Sanabria RN)  Discharge to home or other facility with appropriate resources: Identify barriers to discharge with patient and caregiver     Problem: Pain  Goal: Verbalizes/displays adequate comfort level or baseline comfort level  Outcome: Progressing     Problem: ABCDS Injury Assessment  Goal: Absence of physical injury  Outcome: Progressing     Problem: Safety - Adult  Goal: Free from fall injury  Outcome: Progressing

## 2022-08-27 NOTE — PROGRESS NOTES
Pt c/o 9/10 pain in abd. Gave PRN pain medication, see MAR.      Electronically signed by Preeti Ortega RN on 8/27/2022 at 6:08 PM

## 2022-08-27 NOTE — PROGRESS NOTES
Pt has been c/o nausea throughout the day. Has been declining antiemetics until recently. Gave zofran at 1350. Pt states she has almost had a BM twice.     Electronically signed by Gricel Verma RN on 8/27/2022 at 1:59 PM

## 2022-08-28 VITALS
DIASTOLIC BLOOD PRESSURE: 78 MMHG | SYSTOLIC BLOOD PRESSURE: 113 MMHG | OXYGEN SATURATION: 93 % | RESPIRATION RATE: 18 BRPM | WEIGHT: 234.57 LBS | HEIGHT: 68 IN | TEMPERATURE: 97.6 F | HEART RATE: 90 BPM | BODY MASS INDEX: 35.55 KG/M2

## 2022-08-28 PROCEDURE — 96376 TX/PRO/DX INJ SAME DRUG ADON: CPT

## 2022-08-28 PROCEDURE — 6360000002 HC RX W HCPCS: Performed by: SURGERY

## 2022-08-28 PROCEDURE — 99024 POSTOP FOLLOW-UP VISIT: CPT | Performed by: SURGERY

## 2022-08-28 PROCEDURE — 96361 HYDRATE IV INFUSION ADD-ON: CPT

## 2022-08-28 PROCEDURE — 94760 N-INVAS EAR/PLS OXIMETRY 1: CPT

## 2022-08-28 PROCEDURE — 2500000003 HC RX 250 WO HCPCS: Performed by: SURGERY

## 2022-08-28 PROCEDURE — G0378 HOSPITAL OBSERVATION PER HR: HCPCS

## 2022-08-28 PROCEDURE — 96372 THER/PROPH/DIAG INJ SC/IM: CPT

## 2022-08-28 PROCEDURE — 6370000000 HC RX 637 (ALT 250 FOR IP): Performed by: SURGERY

## 2022-08-28 PROCEDURE — 96366 THER/PROPH/DIAG IV INF ADDON: CPT

## 2022-08-28 RX ORDER — IBUPROFEN 600 MG/1
600 TABLET ORAL EVERY 8 HOURS PRN
Status: DISCONTINUED | OUTPATIENT
Start: 2022-08-28 | End: 2022-08-28 | Stop reason: HOSPADM

## 2022-08-28 RX ADMIN — PANTOPRAZOLE SODIUM 40 MG: 40 TABLET, DELAYED RELEASE ORAL at 05:34

## 2022-08-28 RX ADMIN — HYDROMORPHONE HYDROCHLORIDE 1 MG: 1 INJECTION, SOLUTION INTRAMUSCULAR; INTRAVENOUS; SUBCUTANEOUS at 02:26

## 2022-08-28 RX ADMIN — ACETAMINOPHEN 650 MG: 325 TABLET, FILM COATED ORAL at 08:26

## 2022-08-28 RX ADMIN — CIPROFLOXACIN 400 MG: 2 INJECTION, SOLUTION INTRAVENOUS at 10:31

## 2022-08-28 RX ADMIN — ENOXAPARIN SODIUM 30 MG: 100 INJECTION SUBCUTANEOUS at 08:27

## 2022-08-28 RX ADMIN — METRONIDAZOLE 500 MG: 500 INJECTION, SOLUTION INTRAVENOUS at 02:24

## 2022-08-28 ASSESSMENT — PAIN DESCRIPTION - ORIENTATION: ORIENTATION: LEFT

## 2022-08-28 ASSESSMENT — PAIN SCALES - GENERAL
PAINLEVEL_OUTOF10: 4
PAINLEVEL_OUTOF10: 7
PAINLEVEL_OUTOF10: 4

## 2022-08-28 ASSESSMENT — PAIN DESCRIPTION - DESCRIPTORS: DESCRIPTORS: ACHING

## 2022-08-28 ASSESSMENT — PAIN DESCRIPTION - LOCATION: LOCATION: ABDOMEN

## 2022-08-28 NOTE — PLAN OF CARE
Problem: Discharge Planning  Goal: Discharge to home or other facility with appropriate resources  Outcome: Progressing     Problem: Pain  Goal: Verbalizes/displays adequate comfort level or baseline comfort level  Outcome: Progressing     Problem: ABCDS Injury Assessment  Goal: Absence of physical injury  Outcome: Progressing     Problem: Safety - Adult  Goal: Free from fall injury  Outcome: Progressing     Problem: Neurosensory - Adult  Goal: Achieves stable or improved neurological status  Outcome: Progressing  Goal: Absence of seizures  Outcome: Progressing  Goal: Remains free of injury related to seizures activity  Outcome: Progressing  Goal: Achieves maximal functionality and self care  Outcome: Progressing     Problem: Cardiovascular - Adult  Goal: Maintains optimal cardiac output and hemodynamic stability  Outcome: Progressing  Goal: Absence of cardiac dysrhythmias or at baseline  Outcome: Progressing     Problem: Skin/Tissue Integrity - Adult  Goal: Skin integrity remains intact  Outcome: Progressing  Goal: Incisions, wounds, or drain sites healing without S/S of infection  Outcome: Progressing  Goal: Oral mucous membranes remain intact  Outcome: Progressing

## 2022-08-28 NOTE — DISCHARGE SUMMARY
General Surgery Discharge Summary        Lisa Chi   : 1970 MRN: [id number]  Date of Admission: 2022  Date of Discharge: 22  Admitting Angeli Montes MD  Primary Care Physician: Yamini Cuenca MD  Summary by: Alejandra Nieves MD    Diagnosis Present on Admission:   Acute appendicitis   Purulent peritonitis [facility])      Secondary diagnosis:   Patient Active Problem List   Diagnosis    Chondromalacia, patella    Obstructive sleep apnea    Migraine without status migrainosus, not intractable    Adhesive capsulitis of left shoulder    Acute appendicitis    Purulent peritonitis ([facility] [address]       Consults: none    Procedures: Procedure(s):  LAPAROSCOPIC APPENDECTOMY    Pathology: pending    Summary of hospital stay:   Heather Chi is a 47 y.o. female admitted for appendicitis. Underwent lap appy and found to be non-perforated but with purulent fluid in pelvis. Stable postop with slowly resolving ileus. Tolerating regular PO, passing flatus with improving nausea on D/C. D/C home in good condition on PO abx    Discharge Medications:  Current Discharge Medication List        START taking these medications    Details   amoxicillin-clavulanate (AUGMENTIN) 875-125 MG per tablet Take 1 tablet by mouth 2 times daily for 6 days  Qty: 12 tablet, Refills: 0      oxyCODONE (ROXICODONE) 5 MG immediate release tablet Take 1 tablet by mouth every 6 hours as needed for Pain for up to 5 days. Intended supply: 5 days. Take lowest dose possible to manage pain  Qty: 20 tablet, Refills: 0    Comments: Reduce doses taken as pain becomes manageable  Associated Diagnoses: S/P laparoscopic appendectomy      acetaminophen (TYLENOL) 500 MG tablet Take 1 tablet by mouth every 6 hours as needed for Pain Maximum dose of acetaminophen is 4000 mg from all sources in 24 hours.       polyethylene glycol (MIRALAX) 17 GM/SCOOP POWD powder Take 17 g by mouth daily as needed (take as needed for constipation) CONTINUE these medications which have NOT CHANGED    Details   diclofenac (VOLTAREN) 50 MG EC tablet Take 1 tablet by mouth 2 times daily as needed for Pain  Qty: 60 tablet, Refills: 1    Associated Diagnoses: Tail bone pain; Bilateral posterior neck pain           STOP taking these medications       ibuprofen (ADVIL;MOTRIN) 800 MG tablet Comments:   Reason for Stopping:               Discharged to:  home    Instruction:  The patient is instructed to return to the hospital or call the office for fevers > 101.5F, inability to tolerate a diet, or unexpected pain. Surgery specific discharge instruction sheet was provided to the patient.   Diet: regular diet   Activity: no heavy lifting for 4 weeks    Follow up:  Dr. Stephanie Weller 2 weeks    Electronically signed by Severo Moon, MD on 8/28/2022 at 9:54 AM

## 2022-08-29 ENCOUNTER — TELEPHONE (OUTPATIENT)
Dept: INTERNAL MEDICINE CLINIC | Age: 52
End: 2022-08-29

## 2022-08-29 NOTE — TELEPHONE ENCOUNTER
[facility] Transitions Initial Follow Up Call    Outreach made within 2 business days of discharge: Yes    Patient: Irais Chi Patient : 1970   MRN: [id number]  Reason for Admission: There are no discharge diagnoses documented for the most recent discharge. Discharge Date: 22       Spoke with: patient    Discharge department/facility: [facility]    TCM Interactive Patient Contact:  Was patient able to fill all prescriptions: Yes  Was patient instructed to bring all medications to the follow-up visit: Yes  Is patient taking all medications as directed in the discharge summary?  Yes  Does patient understand their discharge instructions: Yes  Does patient have questions or concerns that need addressed prior to 7-14 day follow up office visit: no    Scheduled appointment with PCP within 7-14 days    Follow Up  Future Appointments   Date Time Provider Minna Elizabeth   2022 11:30 AM [address] [place] [address]

## 2022-09-01 ENCOUNTER — TELEPHONE (OUTPATIENT)
Dept: SURGERY | Age: 52
End: 2022-09-01

## 2022-09-01 NOTE — LETTER
[facility] Surgery  [address] [place] [id number]  Phone: [phone]  Fax: [phone]    Heriberto Duran MD        September 1, 2022     Patient: Eve Chi   YOB: 1970     To Whom It May Concern:    Lisa Chi was seen at [facility] from 8/25/22- 8/28/22. She has a post operative appointment scheduled for 9/15/22. Please excuse her for these absences. If you have any questions or concerns, please don't hesitate to call.     Sincerely,        Heriberto Duran MD

## 2022-09-12 ENCOUNTER — OFFICE VISIT (OUTPATIENT)
Dept: INTERNAL MEDICINE CLINIC | Age: 52
End: 2022-09-12
Payer: COMMERCIAL

## 2022-09-12 VITALS
WEIGHT: 225 LBS | HEART RATE: 79 BPM | OXYGEN SATURATION: 97 % | BODY MASS INDEX: 34.21 KG/M2 | SYSTOLIC BLOOD PRESSURE: 114 MMHG | DIASTOLIC BLOOD PRESSURE: 72 MMHG

## 2022-09-12 DIAGNOSIS — Z09 HOSPITAL DISCHARGE FOLLOW-UP: Primary | ICD-10-CM

## 2022-09-12 DIAGNOSIS — K65.0: ICD-10-CM

## 2022-09-12 DIAGNOSIS — R10.9 ABDOMINAL PAIN, UNSPECIFIED ABDOMINAL LOCATION: ICD-10-CM

## 2022-09-12 PROCEDURE — 99495 TRANSJ CARE MGMT MOD F2F 14D: CPT | Performed by: INTERNAL MEDICINE

## 2022-09-12 PROCEDURE — 1111F DSCHRG MED/CURRENT MED MERGE: CPT | Performed by: INTERNAL MEDICINE

## 2022-09-12 ASSESSMENT — PATIENT HEALTH QUESTIONNAIRE - PHQ9
SUM OF ALL RESPONSES TO PHQ QUESTIONS 1-9: 0
SUM OF ALL RESPONSES TO PHQ9 QUESTIONS 1 & 2: 0
1. LITTLE INTEREST OR PLEASURE IN DOING THINGS: 0
SUM OF ALL RESPONSES TO PHQ QUESTIONS 1-9: 0
SUM OF ALL RESPONSES TO PHQ QUESTIONS 1-9: 0
2. FEELING DOWN, DEPRESSED OR HOPELESS: 0
SUM OF ALL RESPONSES TO PHQ QUESTIONS 1-9: 0

## 2022-09-12 ASSESSMENT — ANXIETY QUESTIONNAIRES
1. FEELING NERVOUS, ANXIOUS, OR ON EDGE: 0
3. WORRYING TOO MUCH ABOUT DIFFERENT THINGS: 0
6. BECOMING EASILY ANNOYED OR IRRITABLE: 0
2. NOT BEING ABLE TO STOP OR CONTROL WORRYING: 0
7. FEELING AFRAID AS IF SOMETHING AWFUL MIGHT HAPPEN: 0
5. BEING SO RESTLESS THAT IT IS HARD TO SIT STILL: 0
GAD7 TOTAL SCORE: 1
4. TROUBLE RELAXING: 1
IF YOU CHECKED OFF ANY PROBLEMS ON THIS QUESTIONNAIRE, HOW DIFFICULT HAVE THESE PROBLEMS MADE IT FOR YOU TO DO YOUR WORK, TAKE CARE OF THINGS AT HOME, OR GET ALONG WITH OTHER PEOPLE: NOT DIFFICULT AT ALL

## 2022-09-12 NOTE — PROGRESS NOTES
Post-Discharge Transitional Care  Follow Up      Lisa Chi   YOB: 1970    Date of Office Visit:  9/12/2022  Date of Hospital Admission: 8/25/22  Date of Hospital Discharge: 8/28/22  Risk of hospital readmission (high >=14%. Medium >=10%) :No data recorded    Care management risk score Rising risk (score 2-5) and Complex Care (Scores >=6): No Risk Score On File     Non face to face  following discharge, date last encounter closed (first attempt may have been earlier): 08/29/2022    Call initiated 2 business days of discharge: Yes    ASSESSMENT/PLAN:   Hospital discharge follow-up  -     TN DISCHARGE MEDS RECONCILED W/ CURRENT OUTPATIENT MED LIST  Abdominal pain, unspecified abdominal location  -     CBC with Auto Differential; Future  Purulent peritonitis ([address]  -     CBC with Auto Differential; Future  - improving but still with pain check cbc and follow up with surgeryy    Medical Decision Making: moderate complexity  Return if symptoms worsen or fail to improve. On this date 9/12/2022 I have spent 20 minutes reviewing previous notes, test results and face to face with the patient discussing the diagnosis and importance of compliance with the treatment plan as well as documenting on the day of the visit. Subjective:   HPI:  Follow up of Hospital problems/diagnosis(es): follow up appendicitis and perotonitis. She still has low grade fevers 100.0 nightly. She is feeling better. On the left lower quadrant she has a burning sharp poke and it resolves. Inpatient course: Discharge summary reviewed- see chart.     Interval history/Current status: see abpve    Patient Active Problem List   Diagnosis    Chondromalacia, patella    Obstructive sleep apnea    Migraine without status migrainosus, not intractable    Adhesive capsulitis of left shoulder    Acute appendicitis    Purulent peritonitis ([address]       Medications listed as ordered at the time of discharge from hospital     Medication List            Accurate as of September 12, 2022 11:45 AM. If you have any questions, ask your nurse or doctor. CONTINUE taking these medications      acetaminophen 500 MG tablet  Commonly known as: TYLENOL  Take 1 tablet by mouth every 6 hours as needed for Pain Maximum dose of acetaminophen is 4000 mg from all sources in 24 hours. diclofenac 50 MG EC tablet  Commonly known as: VOLTAREN  Take 1 tablet by mouth 2 times daily as needed for Pain     polyethylene glycol 17 GM/SCOOP Powd powder  Commonly known as: MIRALAX  Take 17 g by mouth daily as needed (take as needed for constipation)                Medications marked \"taking\" at this time  Outpatient Medications Marked as Taking for the 9/12/22 encounter (Office Visit) with Wenceslao Quintero MD   Medication Sig Dispense Refill    polyethylene glycol (MIRALAX) 17 GM/SCOOP POWD powder Take 17 g by mouth daily as needed (take as needed for constipation)      diclofenac (VOLTAREN) 50 MG EC tablet Take 1 tablet by mouth 2 times daily as needed for Pain 60 tablet 1        Medications patient taking as of now reconciled against medications ordered at time of hospital discharge: Yes    A comprehensive review of systems was negative except for what was noted in the HPI.     Objective:    /72   Pulse 79   Wt 225 lb (102.1 kg)   SpO2 97%   BMI 34.21 kg/m²   General Appearance: alert and oriented to person, place and time, well developed and well- nourished, in no acute distress  Skin: warm and dry, no rash or erythema  Head: normocephalic and atraumatic  Eyes: pupils equal, round, and reactive to light, extraocular eye movements intact, conjunctivae normal  ENT: tympanic membrane, external ear and ear canal normal bilaterally, nose without deformity, nasal mucosa and turbinates normal without polyps  Neck: supple and non-tender without mass, no thyromegaly or thyroid nodules, no cervical lymphadenopathy  Pulmonary/Chest: clear to auscultation bilaterally- no wheezes, rales or rhonchi, normal air movement, no respiratory distress  Cardiovascular: normal rate, regular rhythm, normal S1 and S2, no murmurs, rubs, clicks, or gallops, distal pulses intact, no carotid bruits  Abdomen: soft, mild-tenderness left lower quadrant, non-distended, normal bowel sounds, no masses or organomegaly  Extremities: no cyanosis, clubbing or edema  Musculoskeletal: normal range of motion, no joint swelling, deformity or tenderness  Neurologic: reflexes normal and symmetric, no cranial nerve deficit, gait, coordination and speech normal      An electronic signature was used to authenticate this note.   --Azael Pemberton MD

## 2022-09-13 DIAGNOSIS — K65.0: ICD-10-CM

## 2022-09-13 DIAGNOSIS — R10.9 ABDOMINAL PAIN, UNSPECIFIED ABDOMINAL LOCATION: ICD-10-CM

## 2022-09-13 LAB
BASOPHILS ABSOLUTE: 0.1 K/UL (ref 0–0.2)
BASOPHILS RELATIVE PERCENT: 1 %
EOSINOPHILS ABSOLUTE: 0.2 K/UL (ref 0–0.6)
EOSINOPHILS RELATIVE PERCENT: 4.3 %
HCT VFR BLD CALC: 39.9 % (ref 36–48)
HEMOGLOBIN: 13.2 G/DL (ref 12–16)
LYMPHOCYTES ABSOLUTE: 1.6 K/UL (ref 1–5.1)
LYMPHOCYTES RELATIVE PERCENT: 28.1 %
MCH RBC QN AUTO: 28.5 PG (ref 26–34)
MCHC RBC AUTO-ENTMCNC: 33 G/DL (ref 31–36)
MCV RBC AUTO: 86.2 FL (ref 80–100)
MONOCYTES ABSOLUTE: 0.5 K/UL (ref 0–1.3)
MONOCYTES RELATIVE PERCENT: 8.1 %
NEUTROPHILS ABSOLUTE: 3.3 K/UL (ref 1.7–7.7)
NEUTROPHILS RELATIVE PERCENT: 58.5 %
PDW BLD-RTO: 14.1 % (ref 12.4–15.4)
PLATELET # BLD: 257 K/UL (ref 135–450)
PMV BLD AUTO: 9.7 FL (ref 5–10.5)
RBC # BLD: 4.63 M/UL (ref 4–5.2)
WBC # BLD: 5.6 K/UL (ref 4–11)

## 2022-09-15 ENCOUNTER — OFFICE VISIT (OUTPATIENT)
Dept: SURGERY | Age: 52
End: 2022-09-15

## 2022-09-15 ENCOUNTER — TELEPHONE (OUTPATIENT)
Dept: SURGERY | Age: 52
End: 2022-09-15

## 2022-09-15 VITALS
WEIGHT: 225 LBS | DIASTOLIC BLOOD PRESSURE: 90 MMHG | HEART RATE: 97 BPM | BODY MASS INDEX: 34.21 KG/M2 | SYSTOLIC BLOOD PRESSURE: 143 MMHG

## 2022-09-15 DIAGNOSIS — K35.30 ACUTE APPENDICITIS WITH LOCALIZED PERITONITIS, WITHOUT PERFORATION, ABSCESS, OR GANGRENE: ICD-10-CM

## 2022-09-15 DIAGNOSIS — E27.8 MASS OF BOTH ADRENAL GLANDS (HCC): Primary | ICD-10-CM

## 2022-09-15 PROCEDURE — 99024 POSTOP FOLLOW-UP VISIT: CPT | Performed by: SURGERY

## 2022-09-15 NOTE — TELEPHONE ENCOUNTER
Patient called just to make sure Veronica had her RTW date as 9/26/22. Please call her if you have any questions.

## 2022-09-15 NOTE — Clinical Note
Gordon Rodarte,  I just saw Ms. Lisa Chi back in the office for follow up after her laparoscopic appendectomy. She is doing well and incisions are healing appropriately. She had an incidental finding of bilateral adrenal masses on last CT, so we are going to get a dedicated adrenal CT which will determine plan to follow vs refer to surgical oncology. She is going to follow up with me as needed. Thank you for allowing me to take care of Ms. Chi with you.   Kenrick Hutchinson

## 2022-09-22 NOTE — PROGRESS NOTES
Post Operative Visit    [address] [facility]  [place] [facility]   Preet Lagunas MD    [address], [address]  [place]  Phone: [phone]  Fax: [phone]    Janelle Born A Day   YOB: 1970    Date of Visit:  9/15/2022    No ref. provider found  Thomas Mendoza MD    Subjective:     Janelle Born A Day presents for a two week follow-up s/p laparoscopic appendectomy. Overall she has been doing well since her operation. She has been eating/drinking without difficulty. Her bowel movements have returned to normal. There has been some relief of her pain. It is  controlled with her current pain regimen. She denies any fevers or chills. Pain Score:   2    No Known Allergies  No outpatient medications have been marked as taking for the 9/15/22 encounter (Office Visit) with Zafar Peralta MD.       Vitals:    09/15/22 1005   BP: (!) 143/90   Pulse: 97   Weight: 225 lb (102.1 kg)     Body mass index is 34.21 kg/m². Wt Readings from Last 3 Encounters:   09/15/22 225 lb (102.1 kg)   09/12/22 225 lb (102.1 kg)   08/28/22 234 lb 9.1 oz (106.4 kg)     BP Readings from Last 3 Encounters:   09/15/22 (!) 143/90   09/12/22 114/72   08/28/22 113/78          Objective:    CONSTITUTIONAL:  awake, alert, no apparent distress    LUNGS:  Resp easy and unlabored  ABDOMEN:  incisions c/d/I , no erythema or induration, soft, non-distended, non-tender, voluntary guarding absent,  and hernia absent  MUSCULOSKELETAL: No edema  NEUROLOGIC:  Mental Status Exam:  Level of Alertness:   awake  Orientation:   person, place, time  SKIN: as above      Pathology:  FINAL DIAGNOSIS:     Appendix, appendectomy:      - Acute appendicitis and periappendicitis      KIRSE/KIRSE     ASSESSMENT:     Diagnosis Orders   1. Mass of both adrenal glands ([facility] [address]  CT ADRENALS W WO CONTRAST      2.  Acute appendicitis with localized peritonitis, without perforation, abscess, or gangrene  AFL - Laya Pelaez MD, Gastroenterology, [facility]          PLAN:    Robert Maier A Day is doing well s/p laparoscopic appendectomy. Incisions are healing appropriately. Will plan on having her follow up prn. She is to continue with lifting restrictions for the next 4 weeks to reduce the chance of hernia. Patient referred for initial screening colonoscopy to Dr. Gustavo Negrete. For her adrenal masses seen on CT, will get a adrenal specific CT scan.     Electronically signed by Jesus Richards MD

## 2022-10-12 ENCOUNTER — HOSPITAL ENCOUNTER (OUTPATIENT)
Dept: CT IMAGING | Age: 52
Discharge: HOME | End: 2022-10-12
Payer: COMMERCIAL

## 2022-10-12 DIAGNOSIS — E27.8 MASS OF BOTH ADRENAL GLANDS (HCC): ICD-10-CM

## 2022-10-12 PROCEDURE — 74150 CT ABDOMEN W/O CONTRAST: CPT

## 2022-10-19 ENCOUNTER — OFFICE VISIT (OUTPATIENT)
Dept: INTERNAL MEDICINE CLINIC | Age: 52
End: 2022-10-19
Payer: COMMERCIAL

## 2022-10-19 VITALS
OXYGEN SATURATION: 98 % | HEART RATE: 81 BPM | TEMPERATURE: 96.4 F | DIASTOLIC BLOOD PRESSURE: 80 MMHG | WEIGHT: 229.4 LBS | BODY MASS INDEX: 34.88 KG/M2 | SYSTOLIC BLOOD PRESSURE: 120 MMHG

## 2022-10-19 DIAGNOSIS — R10.13 EPIGASTRIC PAIN: Primary | ICD-10-CM

## 2022-10-19 PROCEDURE — 99213 OFFICE O/P EST LOW 20 MIN: CPT | Performed by: INTERNAL MEDICINE

## 2022-10-19 RX ORDER — GREEN TEA/HOODIA GORDONII 315-12.5MG
1 CAPSULE ORAL 2 TIMES DAILY
Qty: 60 TABLET | Refills: 0 | Status: SHIPPED | OUTPATIENT
Start: 2022-10-19 | End: 2022-11-18

## 2022-10-19 RX ORDER — PANTOPRAZOLE SODIUM 40 MG/1
40 TABLET, DELAYED RELEASE ORAL
Qty: 180 TABLET | Refills: 1 | Status: SHIPPED | OUTPATIENT
Start: 2022-10-19

## 2022-10-19 RX ORDER — LACTOBACILLUS RHAMNOSUS GG 10B CELL
2 CAPSULE ORAL 2 TIMES DAILY
Qty: 60 CAPSULE | Refills: 0 | Status: SHIPPED | OUTPATIENT
Start: 2022-10-19 | End: 2022-11-18

## 2022-10-19 ASSESSMENT — ANXIETY QUESTIONNAIRES
3. WORRYING TOO MUCH ABOUT DIFFERENT THINGS: 0
5. BEING SO RESTLESS THAT IT IS HARD TO SIT STILL: 0
2. NOT BEING ABLE TO STOP OR CONTROL WORRYING: 0
6. BECOMING EASILY ANNOYED OR IRRITABLE: 0
1. FEELING NERVOUS, ANXIOUS, OR ON EDGE: 0
GAD7 TOTAL SCORE: 0
IF YOU CHECKED OFF ANY PROBLEMS ON THIS QUESTIONNAIRE, HOW DIFFICULT HAVE THESE PROBLEMS MADE IT FOR YOU TO DO YOUR WORK, TAKE CARE OF THINGS AT HOME, OR GET ALONG WITH OTHER PEOPLE: NOT DIFFICULT AT ALL
4. TROUBLE RELAXING: 0
7. FEELING AFRAID AS IF SOMETHING AWFUL MIGHT HAPPEN: 0

## 2022-10-19 ASSESSMENT — PATIENT HEALTH QUESTIONNAIRE - PHQ9
8. MOVING OR SPEAKING SO SLOWLY THAT OTHER PEOPLE COULD HAVE NOTICED. OR THE OPPOSITE, BEING SO FIGETY OR RESTLESS THAT YOU HAVE BEEN MOVING AROUND A LOT MORE THAN USUAL: 0
4. FEELING TIRED OR HAVING LITTLE ENERGY: 0
SUM OF ALL RESPONSES TO PHQ QUESTIONS 1-9: 1
3. TROUBLE FALLING OR STAYING ASLEEP: 0
2. FEELING DOWN, DEPRESSED OR HOPELESS: 0
5. POOR APPETITE OR OVEREATING: 1
1. LITTLE INTEREST OR PLEASURE IN DOING THINGS: 0
10. IF YOU CHECKED OFF ANY PROBLEMS, HOW DIFFICULT HAVE THESE PROBLEMS MADE IT FOR YOU TO DO YOUR WORK, TAKE CARE OF THINGS AT HOME, OR GET ALONG WITH OTHER PEOPLE: 0
SUM OF ALL RESPONSES TO PHQ9 QUESTIONS 1 & 2: 0
6. FEELING BAD ABOUT YOURSELF - OR THAT YOU ARE A FAILURE OR HAVE LET YOURSELF OR YOUR FAMILY DOWN: 0
SUM OF ALL RESPONSES TO PHQ QUESTIONS 1-9: 1
SUM OF ALL RESPONSES TO PHQ QUESTIONS 1-9: 1
7. TROUBLE CONCENTRATING ON THINGS, SUCH AS READING THE NEWSPAPER OR WATCHING TELEVISION: 0
SUM OF ALL RESPONSES TO PHQ QUESTIONS 1-9: 1
9. THOUGHTS THAT YOU WOULD BE BETTER OFF DEAD, OR OF HURTING YOURSELF: 0

## 2022-10-19 NOTE — PROGRESS NOTES
Subjective:       Lisa Chi is an 47 y.o. female who presents for evaluation of heartburn. This has been associated with belching, belching and eructation, deep pressure at base of neck, heartburn, epigastric pain, nausea, and regurgitation of undigested food. She denies cough, hoarseness, melena, need to clear throat frequently, nocturnal burning, and odynophagia. Symptoms have been present for 1 month. She denies dysphagia. She has had a weight loss of 7 pounds over a period of 1 month. She denies melena, hematochezia, hematemesis, and coffee ground emesis. Medical therapy in the past has included: none. Patient's medications, allergies, past medical, surgical, social and family histories were reviewed and updated as appropriate. Review of Systems  Pertinent items are noted in HPI.      Objective:       /80 (Site: Right Upper Arm, Position: Sitting, Cuff Size: Large Adult)   Pulse 81   Temp (!) 96.4 °F (35.8 °C)   Wt 229 lb 6.4 oz (104.1 kg)   SpO2 98%   BMI 34.88 kg/m²     General Appearance:    Alert, cooperative, no distress, appears stated age   Head:    Normocephalic, without obvious abnormality, atraumatic   Eyes:    PERRL, conjunctiva/corneas clear, EOM's intact, fundi     benign, both eyes   Ears:    Normal TM's and external ear canals, both ears   Nose:   Nares normal, septum midline, mucosa normal, no drainage    or sinus tenderness   Throat:   Lips, mucosa, and tongue normal; teeth and gums normal   Neck:   Supple, symmetrical, trachea midline, no adenopathy;     thyroid:  no enlargement/tenderness/nodules; no carotid    bruit or JVD   Back:     Symmetric, no curvature, ROM normal, no CVA tenderness   Lungs:     Clear to auscultation bilaterally, respirations unlabored   Chest Wall:    No tenderness or deformity    Heart:    Regular rate and rhythm, S1 and S2 normal, no murmur, rub   or gallop       Abdomen:     Soft, non-tender, bowel sounds active all four quadrants,     no masses, no organomegaly           Extremities:   Extremities normal, atraumatic, no cyanosis or edema   Pulses:   2+ and symmetric all extremities   Skin:   Skin color, texture, turgor normal, no rashes or lesions   Lymph nodes:   Cervical, supraclavicular, and axillary nodes normal   Neurologic:   CNII-XII intact, normal strength, sensation and reflexes     throughout        Assessment:      Gastroesophageal Reflux Disease,      Plan:      Nonpharmacologic treatments were discussed including: eating smaller meals, elevation of the head of bed at night, avoidance of caffeine, chocolate, nicotine and peppermint, and avoiding tight fitting clothing. Will start a trial of proton pump inhibitors.

## 2022-10-19 NOTE — PROGRESS NOTES
Lisa Chi (:  1970) is a 47 y.o. female,Established patient, here for evaluation of the following chief complaint(s):  Gastroesophageal Reflux         ASSESSMENT/PLAN:  {There are no diagnoses linked to this encounter. (Refresh or delete this SmartLink)}    No follow-ups on file. Subjective   SUBJECTIVE/OBJECTIVE:  HPI    Review of Systems     @DOS@    No Known Allergies    Current Outpatient Medications   Medication Sig Dispense Refill    acetaminophen (TYLENOL) 500 MG tablet Take 1 tablet by mouth every 6 hours as needed for Pain Maximum dose of acetaminophen is 4000 mg from all sources in 24 hours. diclofenac (VOLTAREN) 50 MG EC tablet Take 1 tablet by mouth 2 times daily as needed for Pain (Patient not taking: Reported on 10/19/2022) 60 tablet 1     No current facility-administered medications for this visit. Vitals:    10/19/22 1519   BP: 120/80   Site: Right Upper Arm   Position: Sitting   Cuff Size: Large Adult   Pulse: 81   Temp: (!) 96.4 °F (35.8 °C)   SpO2: 98%   Weight: 229 lb 6.4 oz (104.1 kg)     Body mass index is 34.88 kg/m². Wt Readings from Last 3 Encounters:   10/19/22 229 lb 6.4 oz (104.1 kg)   09/15/22 225 lb (102.1 kg)   22 225 lb (102.1 kg)     BP Readings from Last 3 Encounters:   10/19/22 120/80   09/15/22 (!) 143/90   22 114/72       Objective   Physical Exam       {Time Documentation Optional:[id number]}      An electronic signature was used to authenticate this note.     --Ky Alvarez MD

## 2024-01-31 SDOH — ECONOMIC STABILITY: HOUSING INSECURITY
IN THE LAST 12 MONTHS, WAS THERE A TIME WHEN YOU DID NOT HAVE A STEADY PLACE TO SLEEP OR SLEPT IN A SHELTER (INCLUDING NOW)?: NO

## 2024-01-31 SDOH — ECONOMIC STABILITY: FOOD INSECURITY: WITHIN THE PAST 12 MONTHS, YOU WORRIED THAT YOUR FOOD WOULD RUN OUT BEFORE YOU GOT MONEY TO BUY MORE.: SOMETIMES TRUE

## 2024-01-31 SDOH — ECONOMIC STABILITY: FOOD INSECURITY: WITHIN THE PAST 12 MONTHS, THE FOOD YOU BOUGHT JUST DIDN'T LAST AND YOU DIDN'T HAVE MONEY TO GET MORE.: SOMETIMES TRUE

## 2024-01-31 SDOH — ECONOMIC STABILITY: INCOME INSECURITY: HOW HARD IS IT FOR YOU TO PAY FOR THE VERY BASICS LIKE FOOD, HOUSING, MEDICAL CARE, AND HEATING?: SOMEWHAT HARD

## 2024-01-31 ASSESSMENT — PATIENT HEALTH QUESTIONNAIRE - PHQ9
1. LITTLE INTEREST OR PLEASURE IN DOING THINGS: 1
2. FEELING DOWN, DEPRESSED OR HOPELESS: 1
1. LITTLE INTEREST OR PLEASURE IN DOING THINGS: SEVERAL DAYS
SUM OF ALL RESPONSES TO PHQ QUESTIONS 1-9: 2
SUM OF ALL RESPONSES TO PHQ QUESTIONS 1-9: 2
2. FEELING DOWN, DEPRESSED OR HOPELESS: SEVERAL DAYS
SUM OF ALL RESPONSES TO PHQ9 QUESTIONS 1 & 2: 2
SUM OF ALL RESPONSES TO PHQ QUESTIONS 1-9: 2
SUM OF ALL RESPONSES TO PHQ QUESTIONS 1-9: 2
SUM OF ALL RESPONSES TO PHQ9 QUESTIONS 1 & 2: 2

## 2024-02-02 ENCOUNTER — OFFICE VISIT (OUTPATIENT)
Dept: INTERNAL MEDICINE CLINIC | Age: 54
End: 2024-02-02
Payer: COMMERCIAL

## 2024-02-02 VITALS
BODY MASS INDEX: 36.98 KG/M2 | WEIGHT: 244 LBS | DIASTOLIC BLOOD PRESSURE: 60 MMHG | SYSTOLIC BLOOD PRESSURE: 118 MMHG | HEIGHT: 68 IN | OXYGEN SATURATION: 97 % | HEART RATE: 73 BPM

## 2024-02-02 DIAGNOSIS — Z12.11 SCREEN FOR COLON CANCER: ICD-10-CM

## 2024-02-02 DIAGNOSIS — Z12.31 ENCOUNTER FOR SCREENING MAMMOGRAM FOR BREAST CANCER: ICD-10-CM

## 2024-02-02 DIAGNOSIS — Z13.9 ENCOUNTER FOR HEALTH-RELATED SCREENING: ICD-10-CM

## 2024-02-02 DIAGNOSIS — Z82.49 FAMILY HISTORY OF HEART DISEASE: ICD-10-CM

## 2024-02-02 DIAGNOSIS — R42 VERTIGO: Primary | ICD-10-CM

## 2024-02-02 PROCEDURE — 4004F PT TOBACCO SCREEN RCVD TLK: CPT | Performed by: INTERNAL MEDICINE

## 2024-02-02 PROCEDURE — 3017F COLORECTAL CA SCREEN DOC REV: CPT | Performed by: INTERNAL MEDICINE

## 2024-02-02 PROCEDURE — 99213 OFFICE O/P EST LOW 20 MIN: CPT | Performed by: INTERNAL MEDICINE

## 2024-02-02 PROCEDURE — G8427 DOCREV CUR MEDS BY ELIG CLIN: HCPCS | Performed by: INTERNAL MEDICINE

## 2024-02-02 PROCEDURE — G8484 FLU IMMUNIZE NO ADMIN: HCPCS | Performed by: INTERNAL MEDICINE

## 2024-02-02 PROCEDURE — G8417 CALC BMI ABV UP PARAM F/U: HCPCS | Performed by: INTERNAL MEDICINE

## 2024-02-02 RX ORDER — MECLIZINE HYDROCHLORIDE 25 MG/1
25 TABLET ORAL 3 TIMES DAILY PRN
Qty: 90 TABLET | Refills: 0 | Status: SHIPPED | OUTPATIENT
Start: 2024-02-02 | End: 2024-05-02

## 2024-02-02 NOTE — PATIENT INSTRUCTIONS
HOME TREATMENT OF BPPV:  RAMIREZ-DAROFF EXERCISES      The Ramirez-Daroff Exercises are a method of treating BPPV, usually used when the office treatment fails. They succeed in 95% of cases but are more arduous than the office treatments. These exercises are performed in three sets per day for two weeks. In each set, one performs the maneuver as shown five times.   1 repetition = maneuver done to each side in turn (takes 2 minutes)   Suggested Schedule for Ramirez-Daroff exercises   Time Exercise Duration   Morning 5 repetitions 10 minutes   Noon 5 repetitions 10 minutes   Evening 5 repetitions 10 minutes       Start sitting upright (position 1). Then move into the side-lying position (position 2), with the head angled upward about MCC. An easy way to remember this is to imagine someone standing about 6 feet in front of you, and just keep looking at their head at all times. Stay in the side-lying position for 30 seconds, or until the dizziness subsides if this is longer, then go back to the sitting position (position 3). Stay there for 30 seconds, and then go to the opposite side (position 4) and follow the same routine..  These exercises should be performed for two weeks, three times per day, or for three weeks, twice per day. This adds up to 52 sets in total. In most persons, complete relief from symptoms is obtained after 30 sets, or about 10 days. In approximately 30 percent of patients, BPPV will recur within one year. If BPPV recurs, you may wish to add one 10-minute exercise to your daily routine (Kimberly et al, 1999). The Ramirez-Daroff exercises as well as the Semont and Epley maneuvers are compared in an article by James (1994), listed in the reference section.        Vertigo Treatment - How To Treat Vertigo - link to youtube - Maren Elliott MD    Https://www.youtube.com/watch?v=1VWyPgfMuvM

## 2024-02-02 NOTE — PROGRESS NOTES
Lisa Chi (:  1970) is a 54 y.o. female,Established patient, here for evaluation of the following chief complaint(s):  Dizziness         ASSESSMENT/PLAN:  1. Vertigo  -     meclizine (ANTIVERT) 25 MG tablet; Take 1 tablet by mouth 3 times daily as needed for Dizziness, Disp-90 tablet, R-0Normal  2. Encounter for health-related screening  -     TSH; Future  -     Lipid, Fasting; Future  -     Comprehensive Metabolic Panel, Fasting; Future      Return if symptoms worsen or fail to improve.         Subjective   SUBJECTIVE/OBJECTIVE:  Dizziness  This is a new (started on  she rolled over in bed and felt diziness.) problem. The current episode started in the past 7 days. The problem occurs 2 to 4 times per day. The problem has been waxing and waning. Associated symptoms include vertigo. Pertinent negatives include no abdominal pain, anorexia, arthralgias, change in bowel habit, chest pain, chills, congestion, coughing, diaphoresis, fatigue, fever, headaches, joint swelling, myalgias, nausea, neck pain, numbness, rash, sore throat, swollen glands, urinary symptoms, visual change, vomiting or weakness. Nothing aggravates the symptoms. She has tried nothing for the symptoms. The treatment provided no relief.       Review of Systems   Constitutional: Negative.  Negative for chills, diaphoresis, fatigue and fever.   HENT: Negative.  Negative for congestion and sore throat.    Eyes: Negative.    Respiratory: Negative.  Negative for cough.    Cardiovascular: Negative.  Negative for chest pain.   Gastrointestinal: Negative.  Negative for abdominal pain, anorexia, change in bowel habit, nausea and vomiting.   Endocrine: Negative.    Musculoskeletal: Negative.  Negative for arthralgias, joint swelling, myalgias and neck pain.   Skin: Negative.  Negative for rash.   Allergic/Immunologic: Negative.    Neurological:  Positive for dizziness and vertigo. Negative for weakness, numbness and headaches.

## 2024-02-07 DIAGNOSIS — Z13.9 ENCOUNTER FOR HEALTH-RELATED SCREENING: ICD-10-CM

## 2024-02-07 LAB
ALBUMIN SERPL-MCNC: 4.2 G/DL (ref 3.4–5)
ALBUMIN/GLOB SERPL: 2 {RATIO} (ref 1.1–2.2)
ALP SERPL-CCNC: 79 U/L (ref 40–129)
ALT SERPL-CCNC: 14 U/L (ref 10–40)
ANION GAP SERPL CALCULATED.3IONS-SCNC: 12 MMOL/L (ref 3–16)
AST SERPL-CCNC: 16 U/L (ref 15–37)
BILIRUB SERPL-MCNC: 0.4 MG/DL (ref 0–1)
BUN SERPL-MCNC: 12 MG/DL (ref 7–20)
CALCIUM SERPL-MCNC: 8.7 MG/DL (ref 8.3–10.6)
CHLORIDE SERPL-SCNC: 100 MMOL/L (ref 99–110)
CHOLEST SERPL-MCNC: 198 MG/DL (ref 0–199)
CO2 SERPL-SCNC: 28 MMOL/L (ref 21–32)
CREAT SERPL-MCNC: 0.8 MG/DL (ref 0.6–1.1)
GFR SERPLBLD CREATININE-BSD FMLA CKD-EPI: >60 ML/MIN/{1.73_M2}
GLUCOSE P FAST SERPL-MCNC: 91 MG/DL (ref 70–99)
HDLC SERPL-MCNC: 65 MG/DL (ref 40–60)
LDL CHOLESTEROL CALCULATED: 117 MG/DL
POTASSIUM SERPL-SCNC: 5 MMOL/L (ref 3.5–5.1)
PROT SERPL-MCNC: 6.3 G/DL (ref 6.4–8.2)
SODIUM SERPL-SCNC: 140 MMOL/L (ref 136–145)
TRIGL SERPL-MCNC: 80 MG/DL (ref 0–150)
TSH SERPL DL<=0.005 MIU/L-ACNC: 5.43 UIU/ML (ref 0.27–4.2)
VLDLC SERPL CALC-MCNC: 16 MG/DL

## 2024-02-09 ENCOUNTER — OFFICE VISIT (OUTPATIENT)
Dept: INTERNAL MEDICINE CLINIC | Age: 54
End: 2024-02-09
Payer: COMMERCIAL

## 2024-02-09 VITALS
OXYGEN SATURATION: 97 % | DIASTOLIC BLOOD PRESSURE: 64 MMHG | SYSTOLIC BLOOD PRESSURE: 114 MMHG | BODY MASS INDEX: 36.19 KG/M2 | WEIGHT: 238 LBS | HEART RATE: 79 BPM

## 2024-02-09 DIAGNOSIS — G47.33 OSA (OBSTRUCTIVE SLEEP APNEA): ICD-10-CM

## 2024-02-09 DIAGNOSIS — Z00.00 ENCOUNTER FOR WELL ADULT EXAM WITHOUT ABNORMAL FINDINGS: Primary | ICD-10-CM

## 2024-02-09 PROCEDURE — 99396 PREV VISIT EST AGE 40-64: CPT | Performed by: INTERNAL MEDICINE

## 2024-02-09 PROCEDURE — G8484 FLU IMMUNIZE NO ADMIN: HCPCS | Performed by: INTERNAL MEDICINE

## 2024-02-09 NOTE — PROGRESS NOTES
2024    Lisa Chi (:  1970) is a 54 y.o. female, here for a preventive medicine evaluation.    Well Adult Physical   Patient here for a comprehensive physical exam.The patient reports no problems  Do you take any herbs or supplements that were not prescribed by a doctor? no Are you taking calcium supplements? no Are you taking aspirin daily? no        Patient Active Problem List   Diagnosis    Chondromalacia, patella    Obstructive sleep apnea    Migraine without status migrainosus, not intractable    Adhesive capsulitis of left shoulder    Acute appendicitis    Purulent peritonitis (HCC)       Review of Systems   All other systems reviewed and are negative.      Prior to Visit Medications    Medication Sig Taking? Authorizing Provider   meclizine (ANTIVERT) 25 MG tablet Take 1 tablet by mouth 3 times daily as needed for Dizziness Yes Donovan Bauer MD   pantoprazole (PROTONIX) 40 MG tablet Take 1 tablet by mouth 2 times daily (before meals) Yes Donovan Bauer MD   acetaminophen (TYLENOL) 500 MG tablet Take 1 tablet by mouth every 6 hours as needed for Pain Maximum dose of acetaminophen is 4000 mg from all sources in 24 hours.  Sara Curry, APRN - CNP        No Known Allergies    Past Medical History:   Diagnosis Date    Broken tooth 2018    upper right    Capsulitis of shoulder, left     Dental bridge present     Dental crowns present     Migraine without status migrainosus, not intractable 2016    Sleep apnea 2016    uses CPAP sporatically        Past Surgical History:   Procedure Laterality Date     SECTION      x1    LAPAROSCOPIC APPENDECTOMY N/A 2022    LAPAROSCOPIC APPENDECTOMY performed by Chuy Rico MD at [facility] OR    OTHER SURGICAL HISTORY Left 2018    Left Shoulder Manipulation/ Steroid Injection       Social History     Socioeconomic History    Marital status:      Spouse name: Not on file    Number of

## 2024-02-09 NOTE — PATIENT INSTRUCTIONS
[email or web address]  Advance Care Planning     Advance Care Planning opens a door to talk about and write down your wishes before a sudden accident or illness.  Make your goals, values, and preferences known.     This puts you in the ’s seat and helps others know what matters most to you so they won’t have to guess.      Where can you learn more?    Go to [email or web address]   to learn how to:    Name someone you trust to make healthcare decisions for you, only if you can’t. (Healthcare Power of )    Document your wishes for care if you were seriously ill and not expected to recover or are approaching end of life. (Advance Directive or Living Will)    The same page can be found using the QR code below.                Starting a Weight Loss Plan: Care Instructions  Overview     If you're thinking about losing weight, it can be hard to know where to start. Your doctor can help you set up a weight loss plan that best meets your needs. You may want to take a class on nutrition or exercise, or you could join a weight loss support group. If you have questions about how to make changes to your eating or exercise habits, ask your doctor about seeing a registered dietitian or an exercise specialist.  It can be a big challenge to lose weight. But you don't have to make huge changes at once. Make small changes, and stick with them. When those changes become habit, add a few more changes.  If you don't think you're ready to make changes right now, try to pick a date in the future. Make an appointment to see your doctor to discuss whether the time is right for you to start a plan.  Follow-up care is a key part of your treatment and safety. Be sure to make and go to all appointments, and call your doctor if you are having problems. It's also a good idea to know your test results and keep a list of the medicines you take.  How can you care for yourself at home?  Set realistic

## 2024-03-29 DIAGNOSIS — Z12.11 SCREEN FOR COLON CANCER: Primary | ICD-10-CM

## 2024-04-16 NOTE — PROGRESS NOTES
Patient ___ reached   __x___not reached-preop instructions left on voice mail_____________      DATE__4/26/24______ TIME__1115______ARRIVAL__0945_______      Nothing to eat or drink after midnight the night before,except for what the prep instructions call for.If you do not have the instructions or do not understand them please contact your doctors office.    Follow any instructions your doctors office has given you including what medications to take the AM of your procedure and which ones to hold.You may use your inhalers - bring rescue inhalers with you DOS.If you take a long acting insulin the kay prior please cut the dose in half and take no diabetic medications that AM.Follow specific doctors office instructions regarding blood thinners and if they want you to hold and for how long. If you are on a blood thinner and have no instructions please contact the office and ask.    Dress comfortably,bring your insurance card,picture ID,and a complete list of medications, including supplements.    You must have a responsible adult to stay with you during the procedure,drive you home and stay with you.    [facility] phone number [phone] for any questions.      OTHER INSTRUCTIONS(if applicable)_________________________________________________________        VISITOR POLICY(subject to change)    Current visitor policy is 2 visitors per patient.No children allowed. Mask at discretion of facility.  Visiting hours are 8a-8p. Overnight visitors will be at the discretion of the nurse. All policies are subject to change.

## 2024-04-26 ENCOUNTER — ANESTHESIA EVENT (OUTPATIENT)
Dept: ENDOSCOPY | Age: 54
End: 2024-04-26
Payer: COMMERCIAL

## 2024-04-26 ENCOUNTER — HOSPITAL ENCOUNTER (OUTPATIENT)
Age: 54
Setting detail: OUTPATIENT SURGERY
Discharge: HOME | End: 2024-04-26
Attending: INTERNAL MEDICINE | Admitting: INTERNAL MEDICINE
Payer: COMMERCIAL

## 2024-04-26 ENCOUNTER — ANESTHESIA (OUTPATIENT)
Dept: ENDOSCOPY | Age: 54
End: 2024-04-26
Payer: COMMERCIAL

## 2024-04-26 VITALS
SYSTOLIC BLOOD PRESSURE: 117 MMHG | RESPIRATION RATE: 16 BRPM | OXYGEN SATURATION: 100 % | TEMPERATURE: 96.7 F | WEIGHT: 238 LBS | HEIGHT: 68 IN | HEART RATE: 63 BPM | DIASTOLIC BLOOD PRESSURE: 74 MMHG | BODY MASS INDEX: 36.07 KG/M2

## 2024-04-26 LAB — HCG UR QL: NEGATIVE

## 2024-04-26 PROCEDURE — 84703 CHORIONIC GONADOTROPIN ASSAY: CPT

## 2024-04-26 PROCEDURE — 3700000000 HC ANESTHESIA ATTENDED CARE: Performed by: INTERNAL MEDICINE

## 2024-04-26 PROCEDURE — 2580000003 HC RX 258: Performed by: ANESTHESIOLOGY

## 2024-04-26 PROCEDURE — 2709999900 HC NON-CHARGEABLE SUPPLY: Performed by: INTERNAL MEDICINE

## 2024-04-26 PROCEDURE — 7100000011 HC PHASE II RECOVERY - ADDTL 15 MIN: Performed by: INTERNAL MEDICINE

## 2024-04-26 PROCEDURE — 3609027000 HC COLONOSCOPY: Performed by: INTERNAL MEDICINE

## 2024-04-26 PROCEDURE — 2500000003 HC RX 250 WO HCPCS: Performed by: NURSE ANESTHETIST, CERTIFIED REGISTERED

## 2024-04-26 PROCEDURE — 7100000010 HC PHASE II RECOVERY - FIRST 15 MIN: Performed by: INTERNAL MEDICINE

## 2024-04-26 PROCEDURE — 6360000002 HC RX W HCPCS: Performed by: NURSE ANESTHETIST, CERTIFIED REGISTERED

## 2024-04-26 PROCEDURE — 3700000001 HC ADD 15 MINUTES (ANESTHESIA): Performed by: INTERNAL MEDICINE

## 2024-04-26 RX ORDER — IBUPROFEN 200 MG
200 TABLET ORAL EVERY 6 HOURS PRN
COMMUNITY

## 2024-04-26 RX ORDER — SODIUM CHLORIDE 9 MG/ML
INJECTION, SOLUTION INTRAVENOUS CONTINUOUS
Status: DISCONTINUED | OUTPATIENT
Start: 2024-04-26 | End: 2024-04-26 | Stop reason: HOSPADM

## 2024-04-26 RX ORDER — LIDOCAINE HYDROCHLORIDE 20 MG/ML
INJECTION, SOLUTION EPIDURAL; INFILTRATION; INTRACAUDAL; PERINEURAL PRN
Status: DISCONTINUED | OUTPATIENT
Start: 2024-04-26 | End: 2024-04-26 | Stop reason: SDUPTHER

## 2024-04-26 RX ORDER — PROPOFOL 10 MG/ML
INJECTION, EMULSION INTRAVENOUS PRN
Status: DISCONTINUED | OUTPATIENT
Start: 2024-04-26 | End: 2024-04-26 | Stop reason: SDUPTHER

## 2024-04-26 RX ADMIN — SODIUM CHLORIDE: 9 INJECTION, SOLUTION INTRAVENOUS at 10:34

## 2024-04-26 RX ADMIN — PROPOFOL 20 MG: 10 INJECTION, EMULSION INTRAVENOUS at 11:12

## 2024-04-26 RX ADMIN — PROPOFOL 80 MG: 10 INJECTION, EMULSION INTRAVENOUS at 11:10

## 2024-04-26 RX ADMIN — PROPOFOL 150 MCG/KG/MIN: 10 INJECTION, EMULSION INTRAVENOUS at 11:11

## 2024-04-26 RX ADMIN — LIDOCAINE HYDROCHLORIDE 100 MG: 20 INJECTION, SOLUTION EPIDURAL; INFILTRATION; INTRACAUDAL; PERINEURAL at 11:10

## 2024-04-26 ASSESSMENT — ENCOUNTER SYMPTOMS: SHORTNESS OF BREATH: 0

## 2024-04-26 ASSESSMENT — PAIN - FUNCTIONAL ASSESSMENT: PAIN_FUNCTIONAL_ASSESSMENT: 0-10

## 2024-04-26 ASSESSMENT — PAIN SCALES - GENERAL: PAINLEVEL_OUTOF10: 0

## 2024-04-26 NOTE — DISCHARGE INSTRUCTIONS

## 2024-04-26 NOTE — BRIEF OP NOTE
Brief Postoperative Note - Full Note in Chart Review/Procedures tab       Patient: Lisa Chi  YOB: 1970  MRN: [id number]    Date of Procedure: 4/26/2024    Pre-Op Diagnosis Codes:     * Colon cancer screening [Z12.11]    Post-Op Diagnosis: Same       Procedure(s):  COLONOSCOPY DIAGNOSTIC    Surgeon(s):  Isaiah Varela MD    Assistant:  * No surgical staff found *    Anesthesia: Monitor Anesthesia Care    Estimated Blood Loss (mL): Minimal    Complications: None    Specimens:   * No specimens in log *    Implants:  * No implants in log *      Drains: * No LDAs found *    Findings:  Infection Present At Time Of Surgery (PATOS) (choose all levels that have infection present):  No infection present  Other Findings:   Normal colon  Normal terminal ileum    Rec:  Resume diet  Continue present treatment.  F/U WITH ME AS NEEDED  Followup colonoscopy in 10 years  Followup with referring physician as previously instucted    Electronically signed by Isaiah Varela MD on 4/26/2024 at 11:30 AM

## 2024-04-26 NOTE — ANESTHESIA PRE PROCEDURE
Department of Anesthesiology  Preprocedure Note       Name:  Lisa Chi   Age:  54 y.o.  :  1970                                          MRN:  [id number]         Date:  2024      Surgeon: Surgeon(s):  Isaiah Varela MD    Procedure: Procedure(s):  COLONOSCOPY DIAGNOSTIC    Medications prior to admission:   Prior to Admission medications    Medication Sig Start Date End Date Taking? Authorizing Provider   meclizine (ANTIVERT) 25 MG tablet Take 1 tablet by mouth 3 times daily as needed for Dizziness 24  Donovan Bauer MD   pantoprazole (PROTONIX) 40 MG tablet Take 1 tablet by mouth 2 times daily (before meals) 10/19/22   Donovan Bauer MD   acetaminophen (TYLENOL) 500 MG tablet Take 1 tablet by mouth every 6 hours as needed for Pain Maximum dose of acetaminophen is 4000 mg from all sources in 24 hours. 22  Sara Curry, APRN - CNP       Current medications:    Current Facility-Administered Medications   Medication Dose Route Frequency Provider Last Rate Last Admin    0.9 % sodium chloride infusion   IntraVENous Continuous Vitaliy Perales MD           Allergies:  No Known Allergies    Problem List:    Patient Active Problem List   Diagnosis Code    Chondromalacia, patella M22.40    Obstructive sleep apnea G47.33    Migraine without status migrainosus, not intractable G43.909    Adhesive capsulitis of left shoulder M75.02    Acute appendicitis K35.80    Purulent peritonitis (HCC) K65.0       Past Medical History:        Diagnosis Date    Broken tooth 2018    upper right    Capsulitis of shoulder, left     Dental bridge present     Dental crowns present     Migraine without status migrainosus, not intractable 2016    Sleep apnea 2016    uses CPAP sporatically        Past Surgical History:        Procedure Laterality Date     SECTION      x1    LAPAROSCOPIC APPENDECTOMY N/A 2022    LAPAROSCOPIC APPENDECTOMY performed by

## 2024-04-26 NOTE — ANESTHESIA POSTPROCEDURE EVALUATION
Department of Anesthesiology  Postprocedure Note    Patient: Lisa Chi  MRN: [id number]  YOB: 1970  Date of evaluation: 4/26/2024    Procedure Summary       Date: 04/26/24 Room / Location: [place] [address] / [facility]    Anesthesia Start: 1104 Anesthesia Stop: 1130    Procedure: COLONOSCOPY DIAGNOSTIC Diagnosis:       Colon cancer screening      (Colon cancer screening [Z12.11])    Surgeons: Isaiah Varela MD Responsible Provider: Vitaliy Perales MD    Anesthesia Type: MAC ASA Status: 2            Anesthesia Type: No value filed.    Ivonne Phase I: Ivonne Score: 10    Ivonne Phase II: Ivonne Score: 10    Anesthesia Post Evaluation    Patient location during evaluation: PACU  Patient participation: complete - patient participated  Level of consciousness: awake  Airway patency: patent  Nausea & Vomiting: no nausea and no vomiting  Cardiovascular status: hemodynamically stable  Respiratory status: acceptable  Hydration status: stable  Multimodal analgesia pain management approach  Pain management: adequate    No notable events documented.

## 2024-04-26 NOTE — H&P
Gastroenterology Note             Pre-operative History and Physical    Patient: Lisa Chi  : 1970  CSN:     History Obtained From:  patient and/or guardian.     HISTORY OF PRESENT ILLNESS:    The patient is a 54 y.o. female here for average risk screening colonoscopy.      Past Medical History:    Past Medical History:   Diagnosis Date    Broken tooth 2018    upper right    Capsulitis of shoulder, left     Dental bridge present     Dental crowns present     Migraine without status migrainosus, not intractable 2016    Sleep apnea 2016    uses CPAP sporatically      Past Surgical History:    Past Surgical History:   Procedure Laterality Date     SECTION  2008    x1    LAPAROSCOPIC APPENDECTOMY N/A 2022    LAPAROSCOPIC APPENDECTOMY performed by Chuy Rico MD at [facility] OR    OTHER SURGICAL HISTORY Left 2018    Left Shoulder Manipulation/ Steroid Injection     Medications Prior to Admission:   No current facility-administered medications on file prior to encounter.     Current Outpatient Medications on File Prior to Encounter   Medication Sig Dispense Refill    ibuprofen (ADVIL;MOTRIN) 200 MG tablet Take 1 tablet by mouth every 6 hours as needed for Pain      acetaminophen (TYLENOL) 500 MG tablet Take 1 tablet by mouth every 6 hours as needed for Pain Maximum dose of acetaminophen is 4000 mg from all sources in 24 hours.          Allergies:  Patient has no known allergies.      Social History:   Social History     Tobacco Use    Smoking status: Former     Current packs/day: 0.00     Types: Cigarettes     Quit date: 2020     Years since quittin.2    Smokeless tobacco: Current    Tobacco comments:     1 pk/wk   Substance Use Topics    Alcohol use: Yes     Comment: social     Family History:   Family History   Problem Relation Age of Onset    Other Sister         NETO    High Blood Pressure Father     Other Father         NETO    COPD Father
